# Patient Record
Sex: FEMALE | Race: WHITE | NOT HISPANIC OR LATINO
[De-identification: names, ages, dates, MRNs, and addresses within clinical notes are randomized per-mention and may not be internally consistent; named-entity substitution may affect disease eponyms.]

---

## 2018-12-22 ENCOUNTER — TRANSCRIPTION ENCOUNTER (OUTPATIENT)
Age: 33
End: 2018-12-22

## 2019-08-02 ENCOUNTER — TRANSCRIPTION ENCOUNTER (OUTPATIENT)
Age: 34
End: 2019-08-02

## 2020-08-18 ENCOUNTER — APPOINTMENT (OUTPATIENT)
Dept: OBGYN | Facility: CLINIC | Age: 35
End: 2020-08-18
Payer: COMMERCIAL

## 2020-08-18 VITALS
SYSTOLIC BLOOD PRESSURE: 105 MMHG | DIASTOLIC BLOOD PRESSURE: 68 MMHG | HEIGHT: 64 IN | BODY MASS INDEX: 19.63 KG/M2 | WEIGHT: 115 LBS

## 2020-08-18 DIAGNOSIS — Z78.9 OTHER SPECIFIED HEALTH STATUS: ICD-10-CM

## 2020-08-18 DIAGNOSIS — Z30.9 ENCOUNTER FOR CONTRACEPTIVE MANAGEMENT, UNSPECIFIED: ICD-10-CM

## 2020-08-18 PROCEDURE — 99385 PREV VISIT NEW AGE 18-39: CPT

## 2020-08-18 NOTE — PHYSICAL EXAM
[Labia Minora] : labia minora [External Hemorrhoid] : no external hemorrhoids [Normal] : clitoris [Labia Majora] : labia major [FreeTextEntry7] : no uterine or adnexal masses [FreeTextEntry4] : rugous no discharge [FreeTextEntry5] : non tender PAP done

## 2020-08-18 NOTE — CHIEF COMPLAINT
[Initial Visit] : initial GYN visit [FreeTextEntry1] : 35 yr old new patient for annual exam and sands birth cintrol

## 2020-08-24 LAB — CYTOLOGY CVX/VAG DOC THIN PREP: NORMAL

## 2020-09-02 ENCOUNTER — TRANSCRIPTION ENCOUNTER (OUTPATIENT)
Age: 35
End: 2020-09-02

## 2020-11-06 ENCOUNTER — TRANSCRIPTION ENCOUNTER (OUTPATIENT)
Age: 35
End: 2020-11-06

## 2020-12-23 PROBLEM — Z30.9 ENCOUNTER FOR BIRTH CONTROL: Status: RESOLVED | Noted: 2020-08-18 | Resolved: 2020-12-23

## 2021-03-25 ENCOUNTER — NON-APPOINTMENT (OUTPATIENT)
Age: 36
End: 2021-03-25

## 2021-03-25 ENCOUNTER — RX RENEWAL (OUTPATIENT)
Age: 36
End: 2021-03-25

## 2021-09-01 ENCOUNTER — APPOINTMENT (OUTPATIENT)
Dept: OBGYN | Facility: CLINIC | Age: 36
End: 2021-09-01
Payer: COMMERCIAL

## 2021-09-01 ENCOUNTER — NON-APPOINTMENT (OUTPATIENT)
Age: 36
End: 2021-09-01

## 2021-09-01 VITALS
BODY MASS INDEX: 20.49 KG/M2 | WEIGHT: 120 LBS | SYSTOLIC BLOOD PRESSURE: 97 MMHG | DIASTOLIC BLOOD PRESSURE: 68 MMHG | HEIGHT: 64 IN

## 2021-09-01 DIAGNOSIS — Z01.419 ENCOUNTER FOR GYNECOLOGICAL EXAMINATION (GENERAL) (ROUTINE) W/OUT ABNORMAL FINDINGS: ICD-10-CM

## 2021-09-01 PROCEDURE — 99395 PREV VISIT EST AGE 18-39: CPT

## 2021-09-01 RX ORDER — ETONOGESTREL AND ETHINYL ESTRADIOL 11.7; 2.7 MG/1; MG/1
0.12-0.015 INSERT, EXTENDED RELEASE VAGINAL
Qty: 6 | Refills: 5 | Status: ACTIVE | COMMUNITY
Start: 2021-09-01 | End: 1900-01-01

## 2021-09-01 NOTE — PHYSICAL EXAM
[Appropriately responsive] : appropriately responsive [Alert] : alert [No Acute Distress] : no acute distress [No Lymphadenopathy] : no lymphadenopathy [Regular Rate Rhythm] : regular rate rhythm [No Murmurs] : no murmurs [Clear to Auscultation B/L] : clear to auscultation bilaterally [Soft] : soft [Non-tender] : non-tender [Oriented x3] : oriented x3 [FreeTextEntry4] : S1 S2 [FreeTextEntry3] : no thyromegaly [FreeTextEntry7] : no organomegaly [Examination Of The Breasts] : a normal appearance [No Masses] : no breast masses were palpable [Labia Majora] : normal [Labia Minora] : normal [Normal] : normal [FreeTextEntry5] : non tender PAP done [FreeTextEntry6] : no uterine or adnexal masses [FreeTextEntry8] : ml bimanial

## 2021-09-01 NOTE — DISCUSSION/SUMMARY
[FreeTextEntry1] : 36 yr old has girl 2013 and boy 2017 She is in good health denies any problems complete exam done Sent Etgestranl estradiol ring with renewal to pharmacy

## 2021-09-08 LAB — CYTOLOGY CVX/VAG DOC THIN PREP: NORMAL

## 2021-09-16 ENCOUNTER — APPOINTMENT (OUTPATIENT)
Dept: OBGYN | Facility: CLINIC | Age: 36
End: 2021-09-16
Payer: COMMERCIAL

## 2021-09-16 DIAGNOSIS — N94.3 PREMENSTRUAL TENSION SYNDROME: ICD-10-CM

## 2021-09-16 PROCEDURE — 36415 COLL VENOUS BLD VENIPUNCTURE: CPT

## 2021-09-17 PROBLEM — N94.3 PREMENSTRUAL SYNDROME: Status: ACTIVE | Noted: 2021-09-17

## 2021-09-17 LAB
ESTRADIOL SERPL-MCNC: 6 PG/ML
FSH SERPL-MCNC: 12.7 IU/L
LH SERPL-ACNC: 4.5 IU/L
PROGEST SERPL-MCNC: 0.1 NG/ML
PROLACTIN SERPL-MCNC: 6.1 NG/ML
TSH SERPL-ACNC: 1.39 UIU/ML

## 2021-09-17 RX ORDER — FLUOXETINE HYDROCHLORIDE 20 MG/1
20 CAPSULE ORAL
Qty: 30 | Refills: 6 | Status: ACTIVE | COMMUNITY
Start: 2021-09-17 | End: 1900-01-01

## 2021-09-21 LAB
TESTOST BND SERPL-MCNC: 1.3 PG/ML
TESTOSTERONE TOTAL S: 3 NG/DL

## 2021-11-30 ENCOUNTER — TRANSCRIPTION ENCOUNTER (OUTPATIENT)
Age: 36
End: 2021-11-30

## 2022-04-04 ENCOUNTER — TRANSCRIPTION ENCOUNTER (OUTPATIENT)
Age: 37
End: 2022-04-04

## 2022-08-11 ENCOUNTER — RX RENEWAL (OUTPATIENT)
Age: 37
End: 2022-08-11

## 2022-08-11 RX ORDER — ETONOGESTREL AND ETHINYL ESTRADIOL 11.7; 2.7 MG/1; MG/1
0.12-0.015 INSERT, EXTENDED RELEASE VAGINAL
Qty: 4 | Refills: 0 | Status: ACTIVE | COMMUNITY
Start: 2020-08-18 | End: 1900-01-01

## 2022-09-28 ENCOUNTER — RESULT REVIEW (OUTPATIENT)
Age: 37
End: 2022-09-28

## 2023-01-16 ENCOUNTER — NON-APPOINTMENT (OUTPATIENT)
Age: 38
End: 2023-01-16

## 2023-02-09 ENCOUNTER — APPOINTMENT (OUTPATIENT)
Dept: INTERNAL MEDICINE | Facility: CLINIC | Age: 38
End: 2023-02-09
Payer: COMMERCIAL

## 2023-02-09 VITALS
HEIGHT: 64 IN | SYSTOLIC BLOOD PRESSURE: 107 MMHG | HEART RATE: 82 BPM | TEMPERATURE: 98 F | BODY MASS INDEX: 19.12 KG/M2 | DIASTOLIC BLOOD PRESSURE: 65 MMHG | WEIGHT: 112 LBS | OXYGEN SATURATION: 99 %

## 2023-02-09 DIAGNOSIS — Z00.00 ENCOUNTER FOR GENERAL ADULT MEDICAL EXAMINATION W/OUT ABNORMAL FINDINGS: ICD-10-CM

## 2023-02-09 PROCEDURE — 99385 PREV VISIT NEW AGE 18-39: CPT | Mod: 25

## 2023-02-09 PROCEDURE — 36415 COLL VENOUS BLD VENIPUNCTURE: CPT

## 2023-02-09 NOTE — HEALTH RISK ASSESSMENT
[Good] : ~his/her~  mood as  good [No] : No [With Family] : lives with family [] :  [# Of Children ___] : has [unfilled] children [PapSmearComments] : 2022 normal  [FreeTextEntry2] : sales  [Never] : Never

## 2023-02-09 NOTE — HISTORY OF PRESENT ILLNESS
[FreeTextEntry1] : annual well visit  [de-identified] : 37 year old female here for annual well visit. Patient feeling well on Prozac 2 weeks prior to period for premenstrual syndrome and NuvaRing. no other medications or medical history or family history. going to [place] next week with family. no chest pain no sob. feels well overall.

## 2023-02-10 LAB
25(OH)D3 SERPL-MCNC: 52.9 NG/ML
ALBUMIN SERPL ELPH-MCNC: 4.2 G/DL
ALP BLD-CCNC: 75 U/L
ALT SERPL-CCNC: 17 U/L
ANION GAP SERPL CALC-SCNC: 12 MMOL/L
AST SERPL-CCNC: 20 U/L
BASOPHILS # BLD AUTO: 0.04 K/UL
BASOPHILS NFR BLD AUTO: 0.9 %
BILIRUB SERPL-MCNC: 0.8 MG/DL
BUN SERPL-MCNC: 14 MG/DL
CALCIUM SERPL-MCNC: 9.8 MG/DL
CHLORIDE SERPL-SCNC: 102 MMOL/L
CHOLEST SERPL-MCNC: 313 MG/DL
CO2 SERPL-SCNC: 26 MMOL/L
CREAT SERPL-MCNC: 0.97 MG/DL
EGFR: 76 ML/MIN/1.73M2
EOSINOPHIL # BLD AUTO: 0.45 K/UL
EOSINOPHIL NFR BLD AUTO: 9.7 %
ESTIMATED AVERAGE GLUCOSE: 105 MG/DL
FOLATE SERPL-MCNC: 16.7 NG/ML
GLUCOSE SERPL-MCNC: 90 MG/DL
HBA1C MFR BLD HPLC: 5.3 %
HCT VFR BLD CALC: 42.8 %
HDLC SERPL-MCNC: 95 MG/DL
HGB BLD-MCNC: 14 G/DL
IMM GRANULOCYTES NFR BLD AUTO: 0.2 %
LDLC SERPL CALC-MCNC: 177 MG/DL
LYMPHOCYTES # BLD AUTO: 1.64 K/UL
LYMPHOCYTES NFR BLD AUTO: 35.4 %
MAGNESIUM SERPL-MCNC: 2.1 MG/DL
MAN DIFF?: NORMAL
MCHC RBC-ENTMCNC: 29.4 PG
MCHC RBC-ENTMCNC: 32.7 GM/DL
MCV RBC AUTO: 89.9 FL
MONOCYTES # BLD AUTO: 0.33 K/UL
MONOCYTES NFR BLD AUTO: 7.1 %
NEUTROPHILS # BLD AUTO: 2.16 K/UL
NEUTROPHILS NFR BLD AUTO: 46.7 %
NONHDLC SERPL-MCNC: 218 MG/DL
PLATELET # BLD AUTO: 250 K/UL
POTASSIUM SERPL-SCNC: 4.8 MMOL/L
PROT SERPL-MCNC: 7 G/DL
RBC # BLD: 4.76 M/UL
RBC # FLD: 13.6 %
SODIUM SERPL-SCNC: 140 MMOL/L
TRIGL SERPL-MCNC: 205 MG/DL
TSH SERPL-ACNC: 2.05 UIU/ML
VIT B12 SERPL-MCNC: >2000 PG/ML
WBC # FLD AUTO: 4.63 K/UL

## 2023-11-29 ENCOUNTER — APPOINTMENT (OUTPATIENT)
Dept: INTERNAL MEDICINE | Facility: CLINIC | Age: 38
End: 2023-11-29
Payer: COMMERCIAL

## 2023-11-29 VITALS
WEIGHT: 107 LBS | HEIGHT: 64 IN | BODY MASS INDEX: 18.27 KG/M2 | SYSTOLIC BLOOD PRESSURE: 118 MMHG | TEMPERATURE: 97.9 F | HEART RATE: 62 BPM | OXYGEN SATURATION: 100 % | DIASTOLIC BLOOD PRESSURE: 71 MMHG | RESPIRATION RATE: 16 BRPM

## 2023-11-29 DIAGNOSIS — R10.9 UNSPECIFIED ABDOMINAL PAIN: ICD-10-CM

## 2023-11-29 DIAGNOSIS — R39.9 UNSPECIFIED SYMPTOMS AND SIGNS INVOLVING THE GENITOURINARY SYSTEM: ICD-10-CM

## 2023-11-29 LAB
ALBUMIN SERPL ELPH-MCNC: 4.6 G/DL
ALP BLD-CCNC: 83 U/L
ALT SERPL-CCNC: 28 U/L
ANION GAP SERPL CALC-SCNC: 15 MMOL/L
AST SERPL-CCNC: 28 U/L
BILIRUB SERPL-MCNC: 1.2 MG/DL
BILIRUB UR QL STRIP: NORMAL
BUN SERPL-MCNC: 15 MG/DL
CALCIUM SERPL-MCNC: 9.6 MG/DL
CHLORIDE SERPL-SCNC: 101 MMOL/L
CLARITY UR: NORMAL
CO2 SERPL-SCNC: 24 MMOL/L
COLLECTION METHOD: NORMAL
CREAT SERPL-MCNC: 0.9 MG/DL
EGFR: 83 ML/MIN/1.73M2
GLUCOSE SERPL-MCNC: 94 MG/DL
GLUCOSE UR-MCNC: NORMAL
HCG UR QL: 0.2 EU/DL
HCT VFR BLD CALC: 44.4 %
HGB BLD-MCNC: 15 G/DL
HGB UR QL STRIP.AUTO: NORMAL
KETONES UR-MCNC: NORMAL
LEUKOCYTE ESTERASE UR QL STRIP: NORMAL
MCHC RBC-ENTMCNC: 28.6 PG
MCHC RBC-ENTMCNC: 33.8 GM/DL
MCV RBC AUTO: 84.7 FL
NITRITE UR QL STRIP: NORMAL
PH UR STRIP: 6
PLATELET # BLD AUTO: 221 K/UL
POTASSIUM SERPL-SCNC: 5.1 MMOL/L
PROT SERPL-MCNC: 7 G/DL
PROT UR STRIP-MCNC: NORMAL
RBC # BLD: 5.24 M/UL
RBC # FLD: 12.6 %
SODIUM SERPL-SCNC: 139 MMOL/L
SP GR UR STRIP: 1.02
WBC # FLD AUTO: 6.37 K/UL

## 2023-11-29 PROCEDURE — 81003 URINALYSIS AUTO W/O SCOPE: CPT | Mod: QW

## 2023-11-29 PROCEDURE — 99214 OFFICE O/P EST MOD 30 MIN: CPT | Mod: 25

## 2023-12-13 LAB — BACTERIA UR CULT: NORMAL

## 2023-12-14 ENCOUNTER — APPOINTMENT (OUTPATIENT)
Dept: INTERNAL MEDICINE | Facility: CLINIC | Age: 38
End: 2023-12-14

## 2024-12-16 ENCOUNTER — RESULT REVIEW (OUTPATIENT)
Age: 39
End: 2024-12-16

## 2024-12-16 ENCOUNTER — INPATIENT (INPATIENT)
Facility: HOSPITAL | Age: 39
LOS: 1 days | Discharge: HOME | DRG: 64 | End: 2024-12-18
Attending: PSYCHIATRY & NEUROLOGY | Admitting: HOSPITALIST
Payer: COMMERCIAL

## 2024-12-16 VITALS
OXYGEN SATURATION: 97 % | DIASTOLIC BLOOD PRESSURE: 66 MMHG | HEART RATE: 80 BPM | RESPIRATION RATE: 18 BRPM | SYSTOLIC BLOOD PRESSURE: 122 MMHG | TEMPERATURE: 98 F

## 2024-12-16 DIAGNOSIS — R11.2 NAUSEA WITH VOMITING, UNSPECIFIED: ICD-10-CM

## 2024-12-16 DIAGNOSIS — R17 UNSPECIFIED JAUNDICE: ICD-10-CM

## 2024-12-16 DIAGNOSIS — Z90.89 ACQUIRED ABSENCE OF OTHER ORGANS: Chronic | ICD-10-CM

## 2024-12-16 DIAGNOSIS — J98.2 INTERSTITIAL EMPHYSEMA: ICD-10-CM

## 2024-12-16 DIAGNOSIS — I63.9 CEREBRAL INFARCTION, UNSPECIFIED: ICD-10-CM

## 2024-12-16 DIAGNOSIS — Z29.9 ENCOUNTER FOR PROPHYLACTIC MEASURES, UNSPECIFIED: ICD-10-CM

## 2024-12-16 DIAGNOSIS — F32.81 PREMENSTRUAL DYSPHORIC DISORDER: ICD-10-CM

## 2024-12-16 LAB
A1C WITH ESTIMATED AVERAGE GLUCOSE RESULT: 5.5 % — SIGNIFICANT CHANGE UP (ref 4–5.6)
ALBUMIN SERPL ELPH-MCNC: 4 G/DL — SIGNIFICANT CHANGE UP (ref 3.3–5)
ALP SERPL-CCNC: 63 U/L — SIGNIFICANT CHANGE UP (ref 40–120)
ALT FLD-CCNC: 15 U/L — SIGNIFICANT CHANGE UP (ref 10–45)
ANION GAP SERPL CALC-SCNC: 15 MMOL/L — SIGNIFICANT CHANGE UP (ref 5–17)
APTT BLD: 25 SEC — SIGNIFICANT CHANGE UP (ref 24.5–35.6)
AST SERPL-CCNC: 16 U/L — SIGNIFICANT CHANGE UP (ref 10–40)
BASOPHILS # BLD AUTO: 0.02 K/UL — SIGNIFICANT CHANGE UP (ref 0–0.2)
BASOPHILS NFR BLD AUTO: 0.4 % — SIGNIFICANT CHANGE UP (ref 0–2)
BILIRUB SERPL-MCNC: 1.1 MG/DL — SIGNIFICANT CHANGE UP (ref 0.2–1.2)
BLD GP AB SCN SERPL QL: NEGATIVE — SIGNIFICANT CHANGE UP
BUN SERPL-MCNC: 12 MG/DL — SIGNIFICANT CHANGE UP (ref 7–23)
CALCIUM SERPL-MCNC: 9 MG/DL — SIGNIFICANT CHANGE UP (ref 8.4–10.5)
CHLORIDE SERPL-SCNC: 102 MMOL/L — SIGNIFICANT CHANGE UP (ref 96–108)
CHOLEST SERPL-MCNC: 288 MG/DL — HIGH
CO2 SERPL-SCNC: 22 MMOL/L — SIGNIFICANT CHANGE UP (ref 22–31)
CREAT SERPL-MCNC: 0.91 MG/DL — SIGNIFICANT CHANGE UP (ref 0.5–1.3)
EGFR: 81 ML/MIN/1.73M2 — SIGNIFICANT CHANGE UP
EOSINOPHIL # BLD AUTO: 0.09 K/UL — SIGNIFICANT CHANGE UP (ref 0–0.5)
EOSINOPHIL NFR BLD AUTO: 1.8 % — SIGNIFICANT CHANGE UP (ref 0–6)
ESTIMATED AVERAGE GLUCOSE: 111 MG/DL — SIGNIFICANT CHANGE UP (ref 68–114)
GLUCOSE SERPL-MCNC: 98 MG/DL — SIGNIFICANT CHANGE UP (ref 70–99)
HCT VFR BLD CALC: 41.8 % — SIGNIFICANT CHANGE UP (ref 34.5–45)
HDLC SERPL-MCNC: 95 MG/DL — SIGNIFICANT CHANGE UP
HGB BLD-MCNC: 14.1 G/DL — SIGNIFICANT CHANGE UP (ref 11.5–15.5)
IMM GRANULOCYTES NFR BLD AUTO: 0.2 % — SIGNIFICANT CHANGE UP (ref 0–0.9)
INR BLD: 0.98 RATIO — SIGNIFICANT CHANGE UP (ref 0.85–1.16)
LIPID PNL WITH DIRECT LDL SERPL: 171 MG/DL — HIGH
LYMPHOCYTES # BLD AUTO: 1.22 K/UL — SIGNIFICANT CHANGE UP (ref 1–3.3)
LYMPHOCYTES # BLD AUTO: 23.8 % — SIGNIFICANT CHANGE UP (ref 13–44)
MAGNESIUM SERPL-MCNC: 2.3 MG/DL — SIGNIFICANT CHANGE UP (ref 1.6–2.6)
MCHC RBC-ENTMCNC: 28.5 PG — SIGNIFICANT CHANGE UP (ref 27–34)
MCHC RBC-ENTMCNC: 33.7 G/DL — SIGNIFICANT CHANGE UP (ref 32–36)
MCV RBC AUTO: 84.4 FL — SIGNIFICANT CHANGE UP (ref 80–100)
MONOCYTES # BLD AUTO: 0.65 K/UL — SIGNIFICANT CHANGE UP (ref 0–0.9)
MONOCYTES NFR BLD AUTO: 12.7 % — SIGNIFICANT CHANGE UP (ref 2–14)
NEUTROPHILS # BLD AUTO: 3.13 K/UL — SIGNIFICANT CHANGE UP (ref 1.8–7.4)
NEUTROPHILS NFR BLD AUTO: 61.1 % — SIGNIFICANT CHANGE UP (ref 43–77)
NON HDL CHOLESTEROL: 193 MG/DL — HIGH
NRBC # BLD: 0 /100 WBCS — SIGNIFICANT CHANGE UP (ref 0–0)
PHOSPHATE SERPL-MCNC: 3.5 MG/DL — SIGNIFICANT CHANGE UP (ref 2.5–4.5)
PLATELET # BLD AUTO: 196 K/UL — SIGNIFICANT CHANGE UP (ref 150–400)
POTASSIUM SERPL-MCNC: 3.8 MMOL/L — SIGNIFICANT CHANGE UP (ref 3.5–5.3)
POTASSIUM SERPL-SCNC: 3.8 MMOL/L — SIGNIFICANT CHANGE UP (ref 3.5–5.3)
PROT SERPL-MCNC: 6.9 G/DL — SIGNIFICANT CHANGE UP (ref 6–8.3)
PROTHROM AB SERPL-ACNC: 11.3 SEC — SIGNIFICANT CHANGE UP (ref 9.9–13.4)
RBC # BLD: 4.95 M/UL — SIGNIFICANT CHANGE UP (ref 3.8–5.2)
RBC # FLD: 12.8 % — SIGNIFICANT CHANGE UP (ref 10.3–14.5)
RH IG SCN BLD-IMP: POSITIVE — SIGNIFICANT CHANGE UP
SODIUM SERPL-SCNC: 139 MMOL/L — SIGNIFICANT CHANGE UP (ref 135–145)
TRIGL SERPL-MCNC: 128 MG/DL — SIGNIFICANT CHANGE UP
WBC # BLD: 5.12 K/UL — SIGNIFICANT CHANGE UP (ref 3.8–10.5)
WBC # FLD AUTO: 5.12 K/UL — SIGNIFICANT CHANGE UP (ref 3.8–10.5)

## 2024-12-16 PROCEDURE — 93010 ELECTROCARDIOGRAM REPORT: CPT

## 2024-12-16 PROCEDURE — 70547 MR ANGIOGRAPHY NECK W/O DYE: CPT | Mod: 26

## 2024-12-16 PROCEDURE — 93356 MYOCRD STRAIN IMG SPCKL TRCK: CPT

## 2024-12-16 PROCEDURE — 70544 MR ANGIOGRAPHY HEAD W/O DYE: CPT | Mod: 26

## 2024-12-16 PROCEDURE — 93306 TTE W/DOPPLER COMPLETE: CPT | Mod: 26

## 2024-12-16 PROCEDURE — 70551 MRI BRAIN STEM W/O DYE: CPT | Mod: 26

## 2024-12-16 PROCEDURE — 74177 CT ABD & PELVIS W/CONTRAST: CPT | Mod: 26

## 2024-12-16 PROCEDURE — 71260 CT THORAX DX C+: CPT | Mod: 26

## 2024-12-16 PROCEDURE — 99223 1ST HOSP IP/OBS HIGH 75: CPT | Mod: GC

## 2024-12-16 PROCEDURE — 99255 IP/OBS CONSLTJ NEW/EST HI 80: CPT

## 2024-12-16 PROCEDURE — 71045 X-RAY EXAM CHEST 1 VIEW: CPT | Mod: 26

## 2024-12-16 RX ORDER — ENOXAPARIN SODIUM 30 MG/.3ML
40 INJECTION SUBCUTANEOUS EVERY 24 HOURS
Refills: 0 | Status: DISCONTINUED | OUTPATIENT
Start: 2024-12-16 | End: 2024-12-16

## 2024-12-16 RX ORDER — ENOXAPARIN SODIUM 30 MG/.3ML
40 INJECTION SUBCUTANEOUS EVERY 24 HOURS
Refills: 0 | Status: DISCONTINUED | OUTPATIENT
Start: 2024-12-17 | End: 2024-12-18

## 2024-12-16 RX ORDER — PANTOPRAZOLE SODIUM 40 MG/1
40 TABLET, DELAYED RELEASE ORAL
Refills: 0 | Status: DISCONTINUED | OUTPATIENT
Start: 2024-12-16 | End: 2024-12-18

## 2024-12-16 RX ORDER — CLOPIDOGREL 75 MG/1
75 TABLET, FILM COATED ORAL DAILY
Refills: 0 | Status: DISCONTINUED | OUTPATIENT
Start: 2024-12-16 | End: 2024-12-16

## 2024-12-16 RX ORDER — PANTOPRAZOLE SODIUM 40 MG/1
1 TABLET, DELAYED RELEASE ORAL
Refills: 0 | DISCHARGE

## 2024-12-16 RX ORDER — ACETAMINOPHEN 500MG 500 MG/1
650 TABLET, COATED ORAL EVERY 6 HOURS
Refills: 0 | Status: DISCONTINUED | OUTPATIENT
Start: 2024-12-16 | End: 2024-12-18

## 2024-12-16 RX ADMIN — ENOXAPARIN SODIUM 40 MILLIGRAM(S): 30 INJECTION SUBCUTANEOUS at 11:26

## 2024-12-16 RX ADMIN — ACETAMINOPHEN 500MG 650 MILLIGRAM(S): 500 TABLET, COATED ORAL at 10:02

## 2024-12-16 RX ADMIN — Medication 81 MILLIGRAM(S): at 11:26

## 2024-12-16 RX ADMIN — Medication 80 MILLIGRAM(S): at 21:07

## 2024-12-16 RX ADMIN — ACETAMINOPHEN 500MG 650 MILLIGRAM(S): 500 TABLET, COATED ORAL at 11:02

## 2024-12-16 RX ADMIN — PANTOPRAZOLE SODIUM 40 MILLIGRAM(S): 40 TABLET, DELAYED RELEASE ORAL at 05:05

## 2024-12-16 NOTE — H&P ADULT - NSHPPHYSICALEXAM_GEN_ALL_CORE
T(C): 36.9 (12-16-24 @ 01:58), Max: 36.9 (12-16-24 @ 01:58)  HR: 80 (12-16-24 @ 01:58) (80 - 80)  BP: 122/66 (12-16-24 @ 01:58) (122/66 - 122/66)  RR: 18 (12-16-24 @ 01:58) (18 - 18)  SpO2: 97% (12-16-24 @ 01:58) (97% - 97%)    GENERAL: Well-appearing, well-nourished, NAD  EYES: EOMI, no scleral icterus  NECK: No JVD, no carotid bruits  LUNG: CTAB, no wheezes, no rhonchi, no accessory muscle use  HEART: RRR, no m/g/r  ABDOMEN: Soft, NT, ND  EXTREMITIES:  No BLE edema  PSYCH: normal affect, normal mood  SKIN: No rashes or lesions    Detailed neuro exam:  CN: PERRL, EOMI, no facial droop, sensation intact in V1-V3 territories, uvula midline  Motor: b/l UE 5/5; RLE 4/5 hip flexion, LLE 5/5 hip flexion. No pronator drift  Sensory: R sided decreased sensation  Mental status: A&Ox3, able to follow 2 step commands that cross midline, no dysarthria

## 2024-12-16 NOTE — PHYSICAL THERAPY INITIAL EVALUATION ADULT - GENERAL OBSERVATIONS, REHAB EVAL
pt received in bed nad reports feeling warm , pt vss ,  at b/s ; PT/OT present for eval ; in OT eval pt able to track finger but jumping R and L losing focus , R peripheral vision affected unable to track R side peripheral until finger right in front ;; p[t r ue and R le feel numbness and tingling but able to feel light touch

## 2024-12-16 NOTE — PHYSICAL THERAPY INITIAL EVALUATION ADULT - NSPTDISCHREC_GEN_A_CORE
for continued increase in fxl mobility to restore to previous level of functional independence , improve strength R side , endurance , balance , fall prevention education continued ; pt and pt spouse aware pt need assist all fxl activity and adl's  at this time ,  both agreeable to plan/Outpatient PT

## 2024-12-16 NOTE — PHYSICAL THERAPY INITIAL EVALUATION ADULT - BALANCE DISTURBANCE, IDENTIFIED IMPAIRMENT CONTRIBUTE, REHAB EVAL
decrease endurance/impaired motor control/impaired postural control/decreased sensation/impaired sensory feedback/decreased strength

## 2024-12-16 NOTE — PHYSICAL THERAPY INITIAL EVALUATION ADULT - PLANNED THERAPY INTERVENTIONS, PT EVAL
GOAL stairs : pt will negotiate a flight of steps with HR independent in 2  weeks/balance training/bed mobility training/gait training/strengthening/transfer training

## 2024-12-16 NOTE — OCCUPATIONAL THERAPY INITIAL EVALUATION ADULT - NSOTDISCHREC_GEN_A_CORE
Home with family assist for ADL/IADL and outpatient neuro OT to address vision, strength, coordination, and balance

## 2024-12-16 NOTE — H&P ADULT - TIME BILLING
Chart review , case discussion with  other provider , obtain history,  examination of patient , answering questions and concerns , review / orders labs and medications , and documentation

## 2024-12-16 NOTE — PHYSICAL THERAPY INITIAL EVALUATION ADULT - PERTINENT HX OF CURRENT PROBLEM, REHAB EVAL
39 year old right handed female with PMHx PMDD presenting as transfer from [facility] due to acute left occipital lobe infarct. LKW on 12/13/2024 at around 23:00. On 12/14/2024 upon waking up at around 6 AM, patient noticed right upper and lower extremity numbness and weakness. Patient presented to [facility], where code stroke was initiated. NIHSS was 2 for visual field defect in the temporal aspect of the R eye and mild RUE drift. CTH w/o contrast at [facility] revealed acute left occipital lobe infarct with mild associated edema and no ICH. CT perfusion revealed moderate perfusion defect in the left posterior cerebral artery. 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 Tenecteplase was not administered as patient presented outside of window. Mechanical thrombectomy was not performed due to low NIHSS and distal occlusion. Patient was transferred to [facility] for further evaluation. Patient currently states that her right upper and lower extremity have mild tingling sensation and feel weaker compared to her left side. In addition, patient is unable to see in the periphery from her right eye and reports a posterior dull persistent headache. Reports having used Nuvaring for >5 years but was discontinued by OBGYN at [facility], denies history of miscarriages, has 2 children, and denies family history of hypercoagulable disorders. Ambulates independently at baseline, handles ADLs and iADLs independently.  pt passed dysphagia screen 39 year old right handed female with PMHx PMDD presenting as transfer from [facility] due to acute left occipital lobe infarct. LKW on 12/13/2024 at around 23:00. On 12/14/2024 upon waking up at around 6 AM, patient noticed right upper and lower extremity numbness and weakness. Patient presented to [facility], where code stroke was initiated. NIHSS was 2 for visual field defect in the temporal aspect of the R eye and mild RUE drift. CTH w/o contrast at [facility] revealed acute left occipital lobe infarct with mild associated edema and no ICH. CT perfusion revealed moderate perfusion defect in the left posterior cerebral artery. 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 Tenecteplase was not administered as patient presented outside of window. Mechanical thrombectomy was not performed due to low NIHSS and distal occlusion. Patient was transferred to [facility] for further evaluation. Patient currently states that her right upper and lower extremity have mild tingling sensation and feel weaker compared to her left side. In addition, patient is unable to see in the periphery from her right eye and reports a posterior dull persistent headache. Reports having used Nuvaring for >5 years but was discontinued by OBGYN at [facility], denies history of miscarriages, has 2 children, and denies family history of hypercoagulable disorders. Ambulates independently at baseline, handles ADLs and iADLs independently.  pt passed dysphagia screen  ADDENDUM 12/17/24 :   MR HEAD 12/16/24 :Evolving acute/subacute left-sided PCA distribution infarction with associated cytotoxic edema. Small rounded focus of petechial hemorrhagic transformation in the left thalamic infarction bed.    MRA Head and NECK : 12/16/24 : 1.  RIGHT CAROTID NECK CIRCULATION:   Intact.   2.  LEFT CAROTID   NECK CIRCULATION:    Intact.    3.  VERTEBRAL NECK CIRCULATION:   Patent dominant caliber left vertebral artery. Occluded right vertebral artery. T1 hyperintensity is consistent with arterial dissection     4.  ANTERIOR INTRACRANIAL CIRCULATION:     Intact.   5.  POSTERIOR INTRACRANIAL CIRCULATION:   Occluded and flow right vertebral artery. Reversal of flow from the basilar artery to its patent PICA. Left posterior cerebral artery occludes at its distal P1 segment.   Vertebral and right posterior cerebral arteries are intact. 39 year old right handed female with PMHx PMDD presenting as transfer from [facility] due to acute left occipital lobe infarct. LKW on 12/13/2024 at around 23:00. On 12/14/2024 upon waking up at around 6 AM, patient noticed right upper and lower extremity numbness and weakness. Patient presented to [facility], where code stroke was initiated. NIHSS was 2 for visual field defect in the temporal aspect of the R eye and mild RUE drift. CTH w/o contrast at [facility] revealed acute left occipital lobe infarct with mild associated edema and no ICH. CT perfusion revealed moderate perfusion defect in the left posterior cerebral artery. 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 Tenecteplase was not administered as patient presented outside of window. Mechanical thrombectomy was not performed due to low NIHSS and distal occlusion. Patient was transferred to [facility] for further evaluation. Patient currently states that her right upper and lower extremity have mild tingling sensation and feel weaker compared to her left side. In addition, patient is unable to see in the periphery from her right eye and reports a posterior dull persistent headache.  at [facility]. On CT scans, patient found to have pneumomediastinum. Thoracic surgery consulted here at [facility]   12/17 Esophagram completed> await read  Reports having used Nuvaring for >5 years but was discontinued by OBGYN at [facility], denies history of miscarriages, has 2 children, and denies family history of hypercoagulable disorders. Ambulates independently at baseline, handles ADLs and iADLs independently.  pt passed dysphagia screen  ADDENDUM 12/17/24 :   MR HEAD 12/16/24 :Evolving acute/subacute left-sided PCA distribution infarction with associated cytotoxic edema. Small rounded focus of petechial hemorrhagic transformation in the left thalamic infarction bed.    MRA Head and NECK : 12/16/24 : 1.  RIGHT CAROTID NECK CIRCULATION:   Intact.   2.  LEFT CAROTID   NECK CIRCULATION:    Intact.    3.  VERTEBRAL NECK CIRCULATION:   Patent dominant caliber left vertebral artery. Occluded right vertebral artery. T1 hyperintensity is consistent with arterial dissection     4.  ANTERIOR INTRACRANIAL CIRCULATION:     Intact.   5.  POSTERIOR INTRACRANIAL CIRCULATION:   Occluded and flow right vertebral artery. Reversal of flow from the basilar artery to its patent PICA. Left posterior cerebral artery occludes at its distal P1 segment.   Vertebral and right posterior cerebral arteries are intact.

## 2024-12-16 NOTE — CONSULT NOTE ADULT - ASSESSMENT
RECOMMENDATIONS:    [] Stroke in the young workup:      -CT Chest/Abdomen/Pelvis     -LOREN     -PT/PTT/INR, Fibrinogen, Anticardiolipin Ab, Beta-2-GP-1 Ab, Lupus AC, Antithrombin III, Protein C/S, Factor V, Factor VII - IX, Factor XI, Homocysteine, Von Willebrand Factor, Plasminogen panel, DRVVT screen &   ratio, Silica Clotting time,  Prothrombin gene [id number] mutation, MTHFR mutation  [] MRI brain without contrast  [] Will discuss ILR  [] HbA1C and Lipid Panel  [] Atorvastatin 80MG QHS, titrate to LDL<70  [] DVT prophylaxis - Lovenox 40MG SC daily  [] Telemonitoring; Neurochecks and vital signs Q4H  [] Gradual normotension (BP <130/90)  [] BG goal <180, avoid hypoglycemia  [] NPO until clears dysphagia screen, otherwise swallow evaluation  [] Fall, aspiration precautions  [] PT/OT eval  [] Stroke education provided     ASSESSMENT: 39 year old right handed female with PMHx PMDD presenting as transfer from [facility] due to acute left occipital lobe infarct. LKW on 12/13/2024 at around 23:00. On 12/14/2024 upon waking up at around 6 AM, patient noticed right upper and lower extremity numbness and weakness. Patient presented to [facility], where code stroke was initiated. NIHSS was 2 for visual field defect in the temporal aspect of the R eye and mild RUE drift. CTH w/o contrast at [facility] revealed acute left occipital lobe infarct, CT perfusion revealed moderate perfusion defect in the left posterior cerebral artery and CTA H/N revealed occlusion of the V2 segment of the right vertebral artery, occlusion of the P2 segment of the left posterior cerebral artery with diminished vascularity in the left inferior PCA territory. Tenecteplase was not administered as patient presented outside of window. Mechanical thrombectomy was not performed due to low NIHSS and distal occlusion. NIHSS upon presentation to [facility] was 2. Neurological exam on 12/16/2024 revealed RUE and RLE strength 4+/5, sensation intact to light touch in all 4 extremities, and R homonymous hemianopsia.       IMPRESSION: R sensorimotor symptoms and right homonymous hemianopsia, localizing to left brain dysfunction. Imaging revealed acute left occipital lobe infarct, occlusion of the V2 segment of the right vertebral artery, occlusion of the P2 segment of the left posterior cerebral artery with diminished vascularity in the left inferior PCA territory. Etiology likely due to hypercoagulability, possibly due to long-term OCP use vs inherited causes. Mechanism unknown, pending workup.     RECOMMENDATIONS:  [] Stroke in the young workup:      -CT Chest/Abdomen/Pelvis     -LOREN     -PT/PTT/INR, Fibrinogen, Anticardiolipin Ab, Beta-2-GP-1 Ab, Lupus AC, Antithrombin III, Protein C/S, Factor V, Factor VII - IX, Factor XI, Homocysteine, Von Willebrand Factor, Plasminogen panel, DRVVT screen &   ratio, Silica Clotting time,  Prothrombin gene [id number] mutation, MTHFR mutation  [] MRI brain without contrast  [] Will discuss ILR  [] HbA1C and Lipid Panel  [] Continue Aspirin 81 mg qd  [] Atorvastatin 80MG QHS, titrate to LDL<70  [] Telemonitoring; Neurochecks and vital signs Q4H  [] Gradual normotension (BP <130/90)  [] BG goal <180, avoid hypoglycemia  [] NPO until clears dysphagia screen, otherwise swallow evaluation  [] Fall, aspiration precautions  [] PT/OT eval  [] Stroke education provided  [] DVT prophylaxis - Lovenox 40MG SC daily      Note and recommendations considered incomplete until attending attestation.   ASSESSMENT: 39 year old right handed female with PMHx PMDD presenting as transfer from [facility] due to acute left occipital lobe infarct. LKW on 12/13/2024 at around 23:00. On 12/14/2024 upon waking up at around 6 AM, patient noticed right upper and lower extremity numbness and weakness. Patient presented to [facility], where code stroke was initiated. NIHSS was 2 for visual field defect in the temporal aspect of the R eye and mild RUE drift. CTH w/o contrast at [facility] revealed acute left occipital lobe infarct, CT perfusion revealed moderate perfusion defect in the left posterior cerebral artery and CTA H/N revealed occlusion of the V2 segment of the right vertebral artery, occlusion of the P2 segment of the left posterior cerebral artery with diminished vascularity in the left inferior PCA territory. Tenecteplase was not administered as patient presented outside of window. Mechanical thrombectomy was not performed due to low NIHSS and distal occlusion. NIHSS upon presentation to [facility] was 2. Neurological exam on 12/16/2024 revealed RUE and RLE strength 4+/5, sensation intact to light touch in all 4 extremities, and R homonymous hemianopsia.       IMPRESSION: R sensorimotor symptoms and right homonymous hemianopsia, localizing to left brain dysfunction. Imaging revealed acute left occipital lobe infarct, occlusion of the V2 segment of the right vertebral artery, occlusion of the P2 segment of the left posterior cerebral artery with diminished vascularity in the left inferior PCA territory. Etiology likely due to hypercoagulability, possibly due to long-term OCP use vs inherited causes. Mechanism unknown, pending workup.     RECOMMENDATIONS:  [] Stroke in the young workup:      -CT Chest/Abdomen/Pelvis     -LOREN     -PT/PTT/INR, Fibrinogen, Anticardiolipin Ab, Beta-2-GP-1 Ab, Lupus AC, Antithrombin III, Protein C/S, Factor V, Factor VII - IX, Factor XI, Homocysteine, Von Willebrand Factor, Plasminogen panel, DRVVT screen &   ratio, Silica Clotting time,  Prothrombin gene [id number] mutation, MTHFR mutation  [] MRI brain without contrast  [] Will discuss ILR  [] HbA1C and Lipid Panel  [] Continue Aspirin 81 mg qd  [] Atorvastatin 80MG QHS, titrate to LDL<70  [] Telemonitoring; Neurochecks and vital signs Q2H  [] Gradual normotension (BP <130/90)  [] BG goal <180, avoid hypoglycemia  [] NPO until clears dysphagia screen, otherwise swallow evaluation  [] Fall, aspiration precautions  [] PT/OT eval  [] Stroke education provided  [] DVT prophylaxis - Lovenox 40MG SC daily      Note and recommendations considered incomplete until attending attestation.   ASSESSMENT: 39 year old right handed female with PMHx PMDD presenting as transfer from [facility] due to acute left occipital lobe infarct. LKW on 12/13/2024 at around 23:00. On 12/14/2024 upon waking up at around 6 AM, patient noticed right upper and lower extremity numbness and weakness. Patient presented to [facility], where code stroke was initiated. NIHSS was 2 for visual field defect in the temporal aspect of the R eye and mild RUE drift. CTH w/o contrast at [facility] revealed acute left occipital lobe infarct, CT perfusion revealed moderate perfusion defect in the left posterior cerebral artery and CTA H/N revealed occlusion of the V2 segment of the right vertebral artery, occlusion of the P2 segment of the left posterior cerebral artery with diminished vascularity in the left inferior PCA territory. Tenecteplase was not administered as patient presented outside of window. Mechanical thrombectomy was not performed due to low NIHSS and distal occlusion. NIHSS upon presentation to [facility] was 2. Neurological exam on 12/16/2024 revealed RUE and RLE strength 4+/5, sensation intact to light touch in all 4 extremities, and R homonymous hemianopsia.       IMPRESSION: R sensorimotor symptoms and right homonymous hemianopsia, localizing to left brain dysfunction. Imaging revealed acute left occipital lobe infarct, occlusion of the V2 segment of the right vertebral artery, occlusion of the P2 segment of the left posterior cerebral artery with diminished vascularity in the left inferior PCA territory. Etiology likely due to hypercoagulability, possibly due to long-term OCP use vs inherited causes. Mechanism unknown, pending workup.     RECOMMENDATIONS:  [] Stroke in the young workup:      -CT Chest/Abdomen/Pelvis     -LOREN     -PT/PTT/INR, Fibrinogen, Anticardiolipin Ab, Beta-2-GP-1 Ab, Lupus AC, Antithrombin III, Protein C/S, Factor V, Factor VII - IX, Factor XI, Homocysteine, Von Willebrand Factor, Plasminogen panel, DRVVT screen &   ratio, Silica Clotting time,  Prothrombin gene [id number] mutation, MTHFR mutation  [] MRI brain without contrast  [] Will discuss ILR  [] HbA1C and Lipid Panel  [] Continue Aspirin 81 mg qd  [] Atorvastatin 80MG QHS, titrate to LDL<70  [] Telemonitoring; NIHSS stroke Neurochecks and vital signs Q4H  [] Gradual normotension (BP <130/90)  [] BG goal <180, avoid hypoglycemia  [] NPO until clears dysphagia screen, otherwise swallow evaluation  [] Fall, aspiration precautions  [] PT/OT eval  [] Stroke education provided  [] DVT prophylaxis - Lovenox 40MG SC daily      Note and recommendations considered incomplete until attending attestation.

## 2024-12-16 NOTE — PHYSICAL THERAPY INITIAL EVALUATION ADULT - ACTIVE RANGE OF MOTION EXAMINATION, REHAB EVAL
AROm wfl's/bilateral upper extremity Active ROM was WFL (within functional limits)/bilateral  lower extremity Active ROM was WFL (within functional limits)

## 2024-12-16 NOTE — PHYSICAL THERAPY INITIAL EVALUATION ADULT - LEVEL OF INDEPENDENCE: SUPINE/SIT, REHAB EVAL
vc to wait with change of position , pt sat several min  good balance able to don socks and sneakers and tie sneakers Bilaterally independent/supervision

## 2024-12-16 NOTE — PROGRESS NOTE ADULT - SUBJECTIVE AND OBJECTIVE BOX
PROGRESS NOTE:     Patient is a 39y old  Female who presents with a chief complaint of stroke (16 Dec 2024 04:21)      INTERVAL HISTORY: NAEO. VSS. ROS negative.    MEDICATIONS  (STANDING):  aspirin  chewable 81 milliGRAM(s) Oral daily  atorvastatin 80 milliGRAM(s) Oral at bedtime  enoxaparin Injectable 40 milliGRAM(s) SubCutaneous every 24 hours  pantoprazole    Tablet 40 milliGRAM(s) Oral before breakfast    MEDICATIONS  (PRN):  acetaminophen     Tablet .. 650 milliGRAM(s) Oral every 6 hours PRN Temp greater or equal to 38C (100.4F), Mild Pain (1 - 3)  FLUoxetine 20 milliGRAM(s) Oral daily PRN for premenstrual dysphoria disorder      CAPILLARY BLOOD GLUCOSE        I&O's Summary    16 Dec 2024 07:01  -  16 Dec 2024 15:48  --------------------------------------------------------  IN: 670 mL / OUT: 0 mL / NET: 670 mL        PHYSICAL EXAM:  Vital Signs Last 24 Hrs  T(C): 36.7 (16 Dec 2024 12:46), Max: 36.9 (16 Dec 2024 01:58)  T(F): 98 (16 Dec 2024 12:46), Max: 98.5 (16 Dec 2024 01:58)  HR: 89 (16 Dec 2024 12:46) (65 - 145)  BP: 114/76 (16 Dec 2024 12:46) (114/76 - 123/72)  BP(mean): --  RR: 18 (16 Dec 2024 12:46) (18 - 18)  SpO2: 96% (16 Dec 2024 12:46) (90% - 97%)    Parameters below as of 16 Dec 2024 12:46  Patient On (Oxygen Delivery Method): room air        CONSTITUTIONAL: NAD, well-developed  HEENT:   RESPIRATORY: Normal respiratory effort; lungs are clear to auscultation bilaterally  CARDIOVASCULAR: Regular rate and rhythm, normal S1 and S2, no murmur/rub/gallop; No lower extremity edema; Peripheral pulses are 2+ bilaterally  ABDOMEN: Nontender to palpation, normoactive bowel sounds, no rebound/guarding; No hepatosplenomegaly  MUSCULOSKELETAL: no clubbing or cyanosis of digits; no joint swelling or tenderness to palpation  PSYCH: A+O to person, place, and time; affect appropriate    LABS:                        14.1   5.12  )-----------( 196      ( 16 Dec 2024 06:12 )             41.8     12-16    139  |  102  |  12  ----------------------------<  98  3.8   |  22  |  0.91    Ca    9.0      16 Dec 2024 06:14  Phos  3.5     12-16  Mg     2.3     12-16    TPro  6.9  /  Alb  4.0  /  TBili  1.1  /  DBili  x   /  AST  16  /  ALT  15  /  AlkPhos  63  12-16    PT/INR - ( 16 Dec 2024 06:13 )   PT: 11.3 sec;   INR: 0.98 ratio         PTT - ( 16 Dec 2024 06:13 )  PTT:25.0 sec      Urinalysis Basic - ( 16 Dec 2024 06:14 )    Color: x / Appearance: x / SG: x / pH: x  Gluc: 98 mg/dL / Ketone: x  / Bili: x / Urobili: x   Blood: x / Protein: x / Nitrite: x   Leuk Esterase: x / RBC: x / WBC x   Sq Epi: x / Non Sq Epi: x / Bacteria: x          RADIOLOGY:        ******************************  Authored By: Abner Curtis MD PGY1  Internal Medicine  MS Teams  ******************************   PROGRESS NOTE:     Patient is a 39y old  Female who presents with a chief complaint of stroke (16 Dec 2024 04:21)      INTERVAL HISTORY: NAEO. VSS. ROS negative.    MEDICATIONS  (STANDING):  aspirin  chewable 81 milliGRAM(s) Oral daily  atorvastatin 80 milliGRAM(s) Oral at bedtime  enoxaparin Injectable 40 milliGRAM(s) SubCutaneous every 24 hours  pantoprazole    Tablet 40 milliGRAM(s) Oral before breakfast    MEDICATIONS  (PRN):  acetaminophen     Tablet .. 650 milliGRAM(s) Oral every 6 hours PRN Temp greater or equal to 38C (100.4F), Mild Pain (1 - 3)  FLUoxetine 20 milliGRAM(s) Oral daily PRN for premenstrual dysphoria disorder      CAPILLARY BLOOD GLUCOSE        I&O's Summary    16 Dec 2024 07:01  -  16 Dec 2024 15:48  --------------------------------------------------------  IN: 670 mL / OUT: 0 mL / NET: 670 mL        PHYSICAL EXAM:  Vital Signs Last 24 Hrs  T(C): 36.7 (16 Dec 2024 12:46), Max: 36.9 (16 Dec 2024 01:58)  T(F): 98 (16 Dec 2024 12:46), Max: 98.5 (16 Dec 2024 01:58)  HR: 89 (16 Dec 2024 12:46) (65 - 145)  BP: 114/76 (16 Dec 2024 12:46) (114/76 - 123/72)  BP(mean): --  RR: 18 (16 Dec 2024 12:46) (18 - 18)  SpO2: 96% (16 Dec 2024 12:46) (90% - 97%)    Parameters below as of 16 Dec 2024 12:46  Patient On (Oxygen Delivery Method): room air        CONSTITUTIONAL: NAD, well-developed  HEENT: MMM  RESPIRATORY: Normal respiratory effort; lungs are clear to auscultation bilaterally  CARDIOVASCULAR: Regular rate and rhythm, normal S1 and S2, no murmur/rub/gallop; No lower extremity edema; Peripheral pulses are 2+ bilaterally  ABDOMEN: Nontender to palpation, normoactive bowel sounds, no rebound/guarding; No hepatosplenomegaly  MUSCULOSKELETAL: no clubbing or cyanosis of digits; no joint swelling or tenderness to palpation  NEURO: nonfocal CN II-XII, subtle R pronator drift, fluent speech  PSYCH: A+O to person, place, and time; affect appropriate    LABS:                        14.1   5.12  )-----------( 196      ( 16 Dec 2024 06:12 )             41.8     12-16    139  |  102  |  12  ----------------------------<  98  3.8   |  22  |  0.91    Ca    9.0      16 Dec 2024 06:14  Phos  3.5     12-16  Mg     2.3     12-16    TPro  6.9  /  Alb  4.0  /  TBili  1.1  /  DBili  x   /  AST  16  /  ALT  15  /  AlkPhos  63  12-16    PT/INR - ( 16 Dec 2024 06:13 )   PT: 11.3 sec;   INR: 0.98 ratio         PTT - ( 16 Dec 2024 06:13 )  PTT:25.0 sec      Urinalysis Basic - ( 16 Dec 2024 06:14 )    Color: x / Appearance: x / SG: x / pH: x  Gluc: 98 mg/dL / Ketone: x  / Bili: x / Urobili: x   Blood: x / Protein: x / Nitrite: x   Leuk Esterase: x / RBC: x / WBC x   Sq Epi: x / Non Sq Epi: x / Bacteria: x          RADIOLOGY:        ******************************  Authored By: Abner Curtis MD PGY1  Internal Medicine  MS Teams  ******************************

## 2024-12-16 NOTE — PROVIDER CONTACT NOTE (OTHER) - ASSESSMENT
Pt ANO4, s/p acute ischemic stroke, VSS. Numbness and tingling present on right extremities. No c/o discomfort/pain.

## 2024-12-16 NOTE — H&P ADULT - PROBLEM SELECTOR PLAN 5
DVT ppx: SCDs  Diet: regular, DASH  Dispo: pending PT/OT - Zoloft 20mg 2 weeks near menstrual period (home regimen)

## 2024-12-16 NOTE — CONSULT NOTE ADULT - SUBJECTIVE AND OBJECTIVE BOX
Surgery Consult Note  Attending: MD Lenin  Service: Thoracic Surgery    HPI: 39F no pmhx presents as a transfer for [facility] for stroke workup. On 12/13/24, patient developed "stomach bug", had nausea, vomiting, and diarrhea. States she vomiting about 10 times, had retching. Denies blood in stool or vomit. She went to sleep 12/13/24 around 11pm, which was her last known well. She woke up 12/14/24 AM w/ RIGHT arm and leg numbness and RIGHT arm and leg weakness. She went to [facility] for evaluation, where they found a LEFT occipital stroke at [facility]. On CT scans, patient found to have pneumomediastinum. Thoracic surgery consulted.       PAST MEDICAL HISTORY:  PAST MEDICAL & SURGICAL HISTORY:  No pertinent past medical history      S/P tonsillectomy          ALLERGIES:  Allergies    No Known Allergies    Intolerances        SOCIAL HISTORY: Negative for tobacco, etoh, or drug use    FAMILY HISTORY:  FAMILY HISTORY:  No pertinent family history in first degree relatives        PHYSICAL EXAM:  General: NAD, resting comfortably  HEENT: NC/AT, no neck crepitus   Pulmonary: normal resp effort  Cardiovascular: NSR  Abdominal: soft, ND/NT  Extremities: WWP  Neuro: A/O x 3    VITAL SIGNS:  Vital Signs Last 24 Hrs  T(C): 36.7 (16 Dec 2024 12:46), Max: 36.9 (16 Dec 2024 01:58)  T(F): 98 (16 Dec 2024 12:46), Max: 98.5 (16 Dec 2024 01:58)  HR: 76 (16 Dec 2024 17:19) (65 - 145)  BP: 112/79 (16 Dec 2024 17:19) (112/79 - 123/72)  BP(mean): --  RR: 18 (16 Dec 2024 17:19) (18 - 18)  SpO2: 98% (16 Dec 2024 17:19) (90% - 98%)    Parameters below as of 16 Dec 2024 17:19  Patient On (Oxygen Delivery Method): room air        I&O's Summary    16 Dec 2024 07:01  -  16 Dec 2024 20:23  --------------------------------------------------------  IN: 890 mL / OUT: 0 mL / NET: 890 mL        LABS:                        14.1   5.12  )-----------( 196      ( 16 Dec 2024 06:12 )             41.8     12-16    139  |  102  |  12  ----------------------------<  98  3.8   |  22  |  0.91    Ca    9.0      16 Dec 2024 06:14  Phos  3.5     12-16  Mg     2.3     12-16    TPro  6.9  /  Alb  4.0  /  TBili  1.1  /  DBili  x   /  AST  16  /  ALT  15  /  AlkPhos  63  12-16    PT/INR - ( 16 Dec 2024 06:13 )   PT: 11.3 sec;   INR: 0.98 ratio         PTT - ( 16 Dec 2024 06:13 )  PTT:25.0 sec  Urinalysis Basic - ( 16 Dec 2024 06:14 )    Color: x / Appearance: x / SG: x / pH: x  Gluc: 98 mg/dL / Ketone: x  / Bili: x / Urobili: x   Blood: x / Protein: x / Nitrite: x   Leuk Esterase: x / RBC: x / WBC x   Sq Epi: x / Non Sq Epi: x / Bacteria: x      CAPILLARY BLOOD GLUCOSE        LIVER FUNCTIONS - ( 16 Dec 2024 06:14 )  Alb: 4.0 g/dL / Pro: 6.9 g/dL / ALK PHOS: 63 U/L / ALT: 15 U/L / AST: 16 U/L / GGT: x

## 2024-12-16 NOTE — PHYSICAL THERAPY INITIAL EVALUATION ADULT - IMPAIRMENTS FOUND, PT EVAL
aerobic capacity/endurance/gait, locomotion, and balance/muscle strength/sensory integrity/visual motor

## 2024-12-16 NOTE — PROGRESS NOTE ADULT - PROBLEM SELECTOR PLAN 4
TBili elevated to 1.4 at [facility], unclear significance. Ravi's?  - recheck CMP here

## 2024-12-16 NOTE — PHYSICAL THERAPY INITIAL EVALUATION ADULT - GAIT DEVIATIONS NOTED, PT EVAL
decreased dany/increased time in double stance/decreased step length/decreased stride length/decreased swing-to-stance ratio/decreased weight-shifting ability

## 2024-12-16 NOTE — OCCUPATIONAL THERAPY INITIAL EVALUATION ADULT - PERTINENT HX OF CURRENT PROBLEM, REHAB EVAL
39 year old right handed female with PMHx PMDD presenting as transfer from [facility] due to acute left occipital lobe infarct. LKW on 12/13/2024 at around 23:00. On 12/14/2024 upon waking up at around 6 AM, patient noticed right upper and lower extremity numbness and weakness. Patient presented to [facility], where code stroke was initiated. NIHSS was 2 for visual field defect in the temporal aspect of the R eye and mild RUE drift. CTH w/o contrast at [facility] revealed acute left occipital lobe infarct with mild associated edema and no ICH. CT perfusion revealed moderate perfusion defect in the left posterior cerebral artery. 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 Tenecteplase was not administered as patient presented outside of window. Mechanical thrombectomy was not performed due to low NIHSS and distal occlusion. Patient was transferred to [facility] for further evaluation. Patient currently states that her right upper and lower extremity have mild tingling sensation and feel weaker compared to her left side. In addition, patient is unable to see in the periphery from her right eye and reports a posterior dull persistent headache.

## 2024-12-16 NOTE — OCCUPATIONAL THERAPY INITIAL EVALUATION ADULT - DIAGNOSIS, OT EVAL
Pt presents with pain, decreased command following, impaired cognition, decreased ROM, strength, endurance, balance, and coordination, all impacting ability to perform ADLs and functional mobility. Pt presents with pain, impaired vision decreased strength, endurance, balance, and coordination, all impacting ability to perform ADLs and functional mobility.

## 2024-12-16 NOTE — H&P ADULT - ATTENDING COMMENTS
39F w/ no prior PMH presented 12/14/24 to [facility] w/ right facial numbness iso GI illness, CT head w/ acute left occipital lobe infarct , No ICH , out of window for stroke intervention , CT angio showed occlusion of the V2 seg of right vert artery at c3-c4 possible  thrombosis or acute dissection, also noted  submucosal air tracking along deep neck soft tissue, posterior to pharynx c/w  pneumomediastinum. She was Started on asa/statin. Nuva ring removed. Labs unremarkable. EEG neg. She is now transferred to [facility] for further evaluation . Seen by neurology , will follow recommendations for stroke and hypercoagulable work up. Can obtain Vascular consult regarding concern for dissection. pneumomediastinum possibly 2/2 to Retching , can obtain CT chest AP to better characterize.

## 2024-12-16 NOTE — OCCUPATIONAL THERAPY INITIAL EVALUATION ADULT - VISUAL ASSESSMENT: EYE MUSCLE BALANCE
Cardiac Cath Lab Discharge Instructions    Groin:  -Your angiogram puncture site may be tender and/or bruised. If you have any bleeding, site becomes swollen, hard, begins oozing blood or bruising significantly increases, apply direct pressure with your hand and call 911 immediately. Do not drive yourself to the hospital.   -If your puncture site becomes reddened, warm, swollen, or more painful, has drainage, or you develop a fever greater than 101 F, contact your attending physician immediately. If unable to reach your physician, come to the [facility].   -If you experience any new shortness of breath, acute abdominal pain, sudden onset of shoulder pain or chest pain, call 911 immediately. Do not drive yourself to the hospital.   -After 24 hours, remove the dressing. Gently cleanse the site with soap and water. Pat the area dry.  -You may shower after 24 hours. Do not soak in a hot tub, tub bath, or swimming pool until the skin site is healed (usually 5-7 days). Soaking in water can increase the risk of infection at the insertion site.  -No heavy lifting/pushing (greater than 5-10 pounds) for the next 3-4 days.  -Avoid bending at the hip, climbing, or other vigorous activity. Keep affected leg extended on the way home and at home for the first 24 hours.    Vascular Closure Device  -If you have received a closure device such as Angio-Seal, Perclose, or Vascade, you may feel a pea sized lump and/or mild tenderness for the first 24 hours.           Activity  It is recommended to have an adult stay with you for the first 24 hours.  Rest at home for the next 24 hours. Keep your activity limited.  You may resume driving and limited activity     Post Sedation Instructions  We strongly recommend having a responsible adult stay with you for the next 24 hours. You may feel sleepy for the next 24 hours.  For the next 24 hours:  Do NOT drink alcoholic beverages.  Do NOT smoke.  Do NOT make important  decisions or sign important papers.  Do NOT drive a car, operate machinery, or power tools.       Diet/medications   -Resume previous diet and medications upon returning home, unless your physician has specified otherwise.  -In the first 24-48 hours after the procedure, drink plenty of fluids to flush the x-ray dye through your system.  -If you are taking Metformin, or a medication containing metformin (I.e. Glucophage, Glucovance, Avandamet, or Metaglip), do not resume taking this medication for 48 hours after the procedure.    normal

## 2024-12-16 NOTE — H&P ADULT - PROBLEM SELECTOR PLAN 2
Unclear etiology of pneumomediastinum - possibly 2/2 significant retching from previous n/v?  - f/u CT chest as above  - CXR obtained on admission  - consider thoracic surgery eval in AM

## 2024-12-16 NOTE — H&P ADULT - NSHPLABSRESULTS_GEN_ALL_CORE
LABS:  Ordered, in progress        I&O's Summary    BNP    RADIOLOGY & ADDITIONAL STUDIES:  See above for report from [facility]    TELEMETRY: reviewed  Sinus tach 100s, no events    EKG: ordered, pending LABS:  Ordered, in progress    [facility] labs  CBC 12/15/24 00:19 6.25/13.8/213  ESR 12/15/24 19  UA 12/15/24 - negative  [facility] 12/14/24 - Negative  Lipid panel 12/14/24 - Cholesterol 331, Tg 114, ,   CMP 12/15/24 00:19: 137/4.0/106/25/12/0.7/97 ALT 19, AST23, TBili 1.4  CRP 10.6  UTox 12/15/24 Negative        I&O's Summary    BNP    RADIOLOGY & ADDITIONAL STUDIES:  See above for report from [facility]    TELEMETRY: reviewed  Sinus tach 100s, no events    EKG: NSR, no ST deviations

## 2024-12-16 NOTE — CONSULT NOTE ADULT - SUBJECTIVE AND OBJECTIVE BOX
Neurology - Consult Note    -  Spectra: [phone] ([facility]), [phone] ([facility])  -    HPI: Patient JUSTICE FRANK is a 39F with no PMHx presenting as transfer from [facility] due to acute left occipital lobe infarct. LKW on 12/13/2024 at around 23:00. On 12/14/2024 AM, patient noticed right upper and lower extremity numbness and weakness. Patient presented to [facility], where code stroke was initiated. NIHSS was 2 for visual field defect in the temporal aspect of the R eye and mild RUE drift. CTH w/o contrast at [facility] revealed acute left occipital lobe infarct with mild associated edema and no ICH. CT perfusion revealed moderate perfusion defect in the left posterior cerebral artery. CTA H/N revealed occlusion of the V2 segment of the right vertebral artery, occlusion of the P2 segment of the left posterior cerbral artery with diminished vascularity in the left inferior PCA territory. Tenecteplase was not administered as patient presented outside of window. Mechanical thrombectomy was not performed due to low NIHSS and distal occlusion.       Review of Systems:  INCOMPLETE   CONSTITUTIONAL: No fevers or chills  EYES AND ENT: No visual changes or no throat pain   NECK: No pain or stiffness  RESPIRATORY: No hemoptysis or shortness of breath  CARDIOVASCULAR: No chest pain or palpitations  GASTROINTESTINAL: No melena or hematochezia  GENITOURINARY: No dysuria or hematuria  NEUROLOGICAL: +As stated in HPI above  SKIN: No itching, burning, rashes, or lesions   All other review of systems is negative unless indicated above.    Allergies:  No Known Allergies      PMHx/PSHx/Family Hx: As above, otherwise see below       Social Hx:  No current use of tobacco, alcohol, or illicit drugs  Lives with ***    Medications:  MEDICATIONS  (STANDING):  atorvastatin 80 milliGRAM(s) Oral at bedtime  pantoprazole    Tablet 40 milliGRAM(s) Oral before breakfast    MEDICATIONS  (PRN):  acetaminophen     Tablet .. 650 milliGRAM(s) Oral every 6 hours PRN Temp greater or equal to 38C (100.4F), Mild Pain (1 - 3)  FLUoxetine 20 milliGRAM(s) Oral daily PRN for premenstrual dysphoria disorder      Vitals:  T(C): 36.9 (12-16-24 @ 01:58), Max: 36.9 (12-16-24 @ 01:58)  HR: 80 (12-16-24 @ 01:58) (80 - 80)  BP: 122/66 (12-16-24 @ 01:58) (122/66 - 122/66)  RR: 18 (12-16-24 @ 01:58) (18 - 18)  SpO2: 97% (12-16-24 @ 01:58) (97% - 97%)    Physical Examination: INCOMPLETE  General - NAD  Cardiovascular - Peripheral pulses palpable, no edema  Eyes - Fundoscopy with flat, sharp optic discs and no hemorrhage or exudates; Fundoscopy not well visualized; Fundoscopy not performed due to safety precautions in the setting of the COVID-19 pandemic    Neurologic Exam:  Mental status - Eyes open, attending to examiner. Awake, Alert, Oriented to person, place, and time. Speech fluent, repetition and naming intact. Follows simple and complex commands. Attention/concentration, recent and remote memory (including registration and recall), and fund of knowledge intact    Cranial nerves - PERRLA, VFF, EOMI, face sensation (V1-V3) intact b/l, facial strength intact without asymmetry b/l, hearing intact b/l, palate with symmetric elevation, trapezius 5/5 strength b/l, tongue midline on protrusion with full lateral movement    Motor - Normal bulk and tone throughout. No pronator drift.  Strength testing            Deltoid      Biceps      Triceps     Wrist Extension    Wrist Flexion     Interossei         R            5                 5               5                     5                            5                        5                 5  L             5                 5               5                     5                            5                        5                 5              Hip Flexion    Hip Extension    Knee Flexion    Knee Extension    Dorsiflexion    Plantar Flexion  R              5                           5                       5                           5                            5                          5  L              5                           5                        5                           5                            5                          5    Sensation - Light touch intact throughout    DTR's -             Biceps      Triceps     Brachioradialis      Patellar    Ankle    Toes/plantar response  R             2+             2+                  2+                       2+            2+                 Down  L              2+             2+                 2+                        2+           2+                 Down    Coordination - Finger to Nose intact b/l. No tremors appreciated    Gait and station - Normal casual gait. Romberg (-)    Labs:          CAPILLARY BLOOD GLUCOSE              CSF:                  Radiology:     Neurology - Consult Note    -  Spectra: [phone] ([facility]), [phone] ([facility])  -    HPI: 39 year old right handed female with PMHx PMDD presenting as transfer from [facility] due to acute left occipital lobe infarct. LKW on 12/13/2024 at around 23:00. On 12/14/2024 upon waking up at around 6 AM, patient noticed right upper and lower extremity numbness and weakness. Patient presented to [facility], where code stroke was initiated. NIHSS was 2 for visual field defect in the temporal aspect of the R eye and mild RUE drift. CTH w/o contrast at [facility] revealed acute left occipital lobe infarct with mild associated edema and no ICH. CT perfusion revealed moderate perfusion defect in the left posterior cerebral artery. 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 Tenecteplase was not administered as patient presented outside of window. Mechanical thrombectomy was not performed due to low NIHSS and distal occlusion. Patient was transferred to [facility] for further evaluation. Patient currently states that her right upper and lower extremity have mild tingling sensation and feel weaker compared to her left side. In addition, patient is unable to see in the periphery from her right eye and reports a posterior dull persistent headache. Reports having used Nuvaring for >5 years but was discontinued by OBGYN at [facility], denies history of miscarriages, has 2 children, and denies family history of hypercoagulable disorders. Ambulates independently at baseline, handles ADLs and iADLs independently.       Review of Systems:   CONSTITUTIONAL: No fevers or chills  EYES AND ENT: + visual changes or no throat pain   NECK: No pain or stiffness  RESPIRATORY: No hemoptysis or shortness of breath  CARDIOVASCULAR: No chest pain or palpitations  GASTROINTESTINAL: No melena or hematochezia  GENITOURINARY: No dysuria or hematuria  NEUROLOGICAL: +As stated in HPI above  SKIN: No itching or burning  All other review of systems is negative unless indicated above.    Allergies:  No Known Allergies      PMHx/PSHx/Family Hx: As above, otherwise see below       Social Hx:  No current use of tobacco, alcohol, or illicit drugs  Lives with family at home    Medications:  MEDICATIONS  (STANDING):  atorvastatin 80 milliGRAM(s) Oral at bedtime  pantoprazole    Tablet 40 milliGRAM(s) Oral before breakfast    MEDICATIONS  (PRN):  acetaminophen     Tablet .. 650 milliGRAM(s) Oral every 6 hours PRN Temp greater or equal to 38C (100.4F), Mild Pain (1 - 3)  FLUoxetine 20 milliGRAM(s) Oral daily PRN for premenstrual dysphoria disorder      Vitals:  T(C): 36.9 (12-16-24 @ 01:58), Max: 36.9 (12-16-24 @ 01:58)  HR: 80 (12-16-24 @ 01:58) (80 - 80)  BP: 122/66 (12-16-24 @ 01:58) (122/66 - 122/66)  RR: 18 (12-16-24 @ 01:58) (18 - 18)  SpO2: 97% (12-16-24 @ 01:58) (97% - 97%)    Physical Examination:   General - NAD, lying comfortably in bed  Cardiovascular - no edema    Neurologic Exam:  Mental status - Eyes closed, opens to voice, then attends to examiner. Oriented to person, place, and time. Speech fluent, repetition and naming intact. Follows simple commands. Fund of knowledge intact    Cranial nerves - PERRL, right homonymous hemianopsia , EOMI, face sensation (V1-V3) intact b/l, facial strength intact without asymmetry b/l, hearing intact b/l, palate with symmetric elevation, trapezius 5/5 strength b/l, tongue midline on protrusion with full lateral movement    Motor - Decreased bulk throughout. No pronator drift.  Strength testing            Deltoid      Biceps      Triceps     Wrist Extension    Wrist Flexion     Interossei         R            4+               4+             5                     4+                4+                 4+               4+  L             5                 5               5                     5                   5                   5                 5              Hip Flexion    Hip Extension    Knee Flexion    Knee Extension    Dorsiflexion    Plantar Flexion  R              4+                        4+                       5                 5                            5                          5  L              5                           5                        5                   5                            5                          5    Sensation - Light touch intact throughout.     DTR's -             Biceps      Triceps     Brachioradialis      Patellar    Ankle      R             3+             3+                  3+                3+            2+                   L              3+             3+                 3+                 3+           2+                   Coordination - Finger to Nose intact b/l. No tremors appreciated. No ataxia on heel to shin.     Gait and station - Normal casual gait.     [facility] SS:  DATE: 12/15/2024  TIME: 4:40 AM  1A: Level of consciousness (0-3): 0  1B: Questions (0-2): 0    1C: Commands (0-2): 0  2: Gaze (0-2): 0  3: Visual fields (0-3): 2  4: Facial palsy (0-3): 0  MOTOR:  5A: Left arm motor drift (0-4): 0  5B: Right arm motor drift (0-4): 0  6A: Left leg motor drift (0-4): 0  6B: Right leg motor drift (0-4): 0  7: Limb ataxia (0-2): 0  SENSORY:  8: Sensation (0-2): 0  SPEECH:  9: Language (0-3): 0  10: Dysarthria (0-2): 0  EXTINCTION:  11: Extinction/inattention (0-2): 0    TOTAL SCORE: 2    Labs:          CAPILLARY BLOOD GLUCOSE              CSF:                  Radiology:     Yes

## 2024-12-16 NOTE — H&P ADULT - HISTORY OF PRESENT ILLNESS
INCOMPLETE INCOMPLETE    Review of [facility] records  CTH noncon - acute left occipital lobe infarct w/ mild associated edema. No evidence of acute intracranial hemorrhage. Submucosal air tracking along the deep neck soft tissue posterior to the pharynx, related to pneumomediastinum visualized on same day CTA neck  CT head brain perfusion: moderate perfusion defect in the left posterior cerebral artery territory  CTA head and neck: occlusion of the V2 segment of the right vertebral artery at C3-C4 may reflect acute thrombosis or dissection. Reconstitution at the V4 segment likely reflects retrograde filling. Occlusion of the P2 segment of the left posterior cerebral artery with diminished vascularity in the left inferior PCA territory. Partially visualized pneumomediastinum tracking to the neck within the deep neck soft tissue 39F no pmhx presents as a transfer for [facility] for stroke workup. On 12/13/24, patient developed "stomach bug", had nausea, vomiting, and diarrhea. States she vomiting about 10 times, had retching. Denies blood in stool or vomit. She went to sleep 12/13/24 around 11pm, which was her last known well. She woke up 12/14/24 AM w/ RIGHT arm and leg numbness and RIGHT arm and leg weakness. She went to [facility] for evaluation, where they found a LEFT occipital infarct w/ LEFT PCA perfusion defect and vertebral thrombosis vs. dissection. She was also noted to have pneumomediastinum. She was out of the window for TNK, so it was not given. She was monitored at [facility] and then transferred to [facility] for further eval. She denies additional new symptoms now; continues ot have RIGHT arm and leg numbness and weakness. Diarrhea and vomiting have since resolved.    Review of [facility] records  CTH noncon - acute left occipital lobe infarct w/ mild associated edema. No evidence of acute intracranial hemorrhage. Submucosal air tracking along the deep neck soft tissue posterior to the pharynx, related to pneumomediastinum visualized on same day CTA neck  CT head brain perfusion: moderate perfusion defect in the left posterior cerebral artery territory  CTA head and neck: occlusion of the V2 segment of the right vertebral artery at C3-C4 may reflect acute thrombosis or dissection. Reconstitution at the V4 segment likely reflects retrograde filling. Occlusion of the P2 segment of the left posterior cerebral artery with diminished vascularity in the left inferior PCA territory. Partially visualized pneumomediastinum tracking to the neck within the deep neck soft tissue 39F no pmhx presents as a transfer for [facility] for stroke workup. On 12/13/24, patient developed "stomach bug", had nausea, vomiting, and diarrhea. States she vomiting about 10 times, had retching. Denies blood in stool or vomit. She went to sleep 12/13/24 around 11pm, which was her last known well. She woke up 12/14/24 AM w/ RIGHT arm and leg numbness and RIGHT arm and leg weakness. She went to [facility] for evaluation, where they found a LEFT occipital infarct w/ LEFT PCA perfusion defect and vertebral thrombosis vs. dissection. She was also noted to have pneumomediastinum. She was out of the window for TNK, so it was not given. She was monitored at [facility] and then transferred to [facility] for further eval. She denies additional new symptoms now; continues ot have RIGHT arm and leg numbness and weakness. Diarrhea and vomiting have since resolved. Denies vision changes.    Review of [facility] records  CTH noncon - acute left occipital lobe infarct w/ mild associated edema. No evidence of acute intracranial hemorrhage. Submucosal air tracking along the deep neck soft tissue posterior to the pharynx, related to pneumomediastinum visualized on same day CTA neck  CT head brain perfusion: moderate perfusion defect in the left posterior cerebral artery territory  CTA head and neck: occlusion of the V2 segment of the right vertebral artery at C3-C4 may reflect acute thrombosis or dissection. Reconstitution at the V4 segment likely reflects retrograde filling. Occlusion of the P2 segment of the left posterior cerebral artery with diminished vascularity in the left inferior PCA territory. Partially visualized pneumomediastinum tracking to the neck within the deep neck soft tissue

## 2024-12-16 NOTE — PATIENT PROFILE ADULT - FLU SEASON?
Yes... H Plasty Text: Given the location of the defect, shape of the defect and the proximity to free margins a H-plasty was deemed most appropriate for repair.  Using a sterile surgical marker, the appropriate advancement arms of the H-plasty were drawn incorporating the defect and placing the expected incisions within the relaxed skin tension lines where possible. The area thus outlined was incised deep to adipose tissue with a #15 scalpel blade. The skin margins were undermined to an appropriate distance in all directions utilizing iris scissors.  The opposing advancement arms were then advanced into place in opposite direction and anchored with interrupted buried subcutaneous sutures.

## 2024-12-16 NOTE — H&P ADULT - ASSESSMENT
39F no pmhx presents as a transfer for [facility] for stroke workup. 39F no pmhx presents as a transfer for [facility] for stroke workup, found to have LEFT occipital stroke at [facility].

## 2024-12-16 NOTE — PHYSICAL THERAPY INITIAL EVALUATION ADULT - IMPAIRMENTS CONTRIBUTING TO GAIT DEVIATIONS, PT EVAL
decrease endurance ; pt walked to BR cg of1 sat on toilet + urinate x few min supervision , pt then amb back to bed cg of 1 pt wish to return to bed yessenia Snow now with pt pt semi supine in bed hr back to 80's sinus ; rn to take temp/impaired balance/impaired postural control/decreased sensation/impaired sensory feedback/decreased strength

## 2024-12-16 NOTE — PHYSICAL THERAPY INITIAL EVALUATION ADULT - ADDITIONAL COMMENTS
MARINA dickson R handed ; pt lives in house with 5 steps to enter with HR and house is [place] so 5 steps to main level with HR as well (pt can function on one level of home (basement is 5 steps down with HR to laundry and den /office ) ; pt lives with spouse and 2 children ages 7 and 11 ) , ;pt works in sales ; pt independent PTA no AD

## 2024-12-16 NOTE — CONSULT NOTE ADULT - TIME BILLING
39-year-old right-handed lady evaluated at [facility] on 12/16/2024 with aphasia and right visual field deficit.  History and exam as above, with minor changes.  ROS otherwise negative.  Exam.  Alert and attentive; speech with only extremely subtle and rare hesitancy, and possibly subtle circumlocutions; followed crossed commands; naming and repetition intact; right superior quadrantanopia; remainder of neurologic exam was nonfocal.  LDL = 171    Impression.  Her neurologic exam shows probable subtle ill-defined aphasia, with subtle hesitancy or circumlocutions and a right superior quadrantanopia.  To summarize, on or about 12/12/2024 she had gastroenteritis with severe and repeated vomiting.  On or about 12/13/2024 she was texting her , and her  noted that the texts did not make sense, and consisted mostly of random letters and called EMS and went to [facility].  She noted right posterior neck pain.  At [facility], by report, neuroimaging showed a left PCA infarct; right vertebral artery occlusion at the V2 segment and a left P2 occlusion.  While uncertain, I suspect that she might have had a right vertebral dissection as the mechanism of her stroke, but this remains to be confirmed.  Pending clarification of the diagnosis, the relevance of the NUVA ring remains indeterminate.  Plan.  Will follow-up MRI brain/MRA neck and head; if necessary, will add T1-weighted fat-sat sequence; if MR imaging confirms right vertebral dissection, then no role for further neurovascular investigation; if there is no evidence of dissection, then will need further workup for stroke in the young; for now, we will treat per ATAMIS with aspirin/Plavix for 3 weeks, followed by aspirin alone; if dissection is confirmed, may change antiplatelet agents to apixaban for at least the next 3 months; statin can be considered based on her 10-year ASCVD risk calculation; PT/OT/speech therapy.

## 2024-12-16 NOTE — OCCUPATIONAL THERAPY INITIAL EVALUATION ADULT - PLANNED THERAPY INTERVENTIONS, OT EVAL
I will call you with your ultrasound results.  Try and not push on site as I think that may be causing it to be more painful.  Return if symptoms worsen or fail to improve.  
ADL retraining/balance training/bed mobility training/cognitive, visual perceptual/fine motor coordination training/transfer training

## 2024-12-16 NOTE — OCCUPATIONAL THERAPY INITIAL EVALUATION ADULT - FINE MOTOR COORDINATION, RIGHT HAND THUMB/FINGER OPPOSITION SKILLS, OT EVAL
Chief Complaint   Patient presents with    Follow-up     diabetic       Health Maintenance Due   Topic    Eye Exam Retinal or Dilated     Medicare Yearly Exam         1. Have you been to the ER, urgent care clinic since your last visit? Hospitalized since your last visit? Yes 07/19/22- finger blister- [facility]    2. Have you seen or consulted any other health care providers outside of the [address] [place] [address] since your last visit? Include any pap smears or colon screening.  No    Visit Vitals  BP (!) 152/69 (BP 1 Location: Left upper arm, BP Patient Position: Sitting, BP Cuff Size: Adult long)   Pulse 65   Temp 97.1 °F (36.2 °C) (Temporal)   Resp 16   Ht 5' 9\" (1.753 m)   Wt 225 lb 3.2 oz (102.2 kg)   SpO2 99%   BMI 33.26 kg/m² normal performance

## 2024-12-16 NOTE — H&P ADULT - PROBLEM SELECTOR PLAN 4
- Zoloft 20mg 2 weeks near menstrual period (home regimen) TBili elevated to 1.4 at [facility], unclear significance. Ravi's?  - recheck CMP here

## 2024-12-16 NOTE — CONSULT NOTE ADULT - ASSESSMENT
39F no pmhx presents as a transfer for [facility] for stroke workup. On 12/13/24, patient developed "stomach bug", had nausea, vomiting, and diarrhea. States she vomiting about 10 times, had retching. Denies blood in stool or vomit. She went to sleep 12/13/24 around 11pm, which was her last known well. She woke up 12/14/24 AM w/ RIGHT arm and leg numbness and RIGHT arm and leg weakness. She went to [facility] for evaluation, where they found a LEFT occipital stroke at [facility]. On CT scans, patient found to have pneumomediastinum. Thoracic surgery consulted.     Plan:   - Patient currently asymptomatic   - Would make patient NPO and obtain a gastrograffin esophagram to rule out esophageal injury   - Thoracic surgery to follow     Discussed with attending, Dr. Phelps     Thoracic Surgery   [id number]

## 2024-12-16 NOTE — PHYSICAL THERAPY INITIAL EVALUATION ADULT - IMPAIRED TRANSFERS: SIT/STAND, REHAB EVAL
decrease endurance , sit -stand from bed cg of 1 to close supervision , stood in place x 5 min pt felt warmer , lightheaded and waves of nausea BP stand 116/76 HR up to 145 Sinus tach on tele per rn Go who checked tele ,; Pt then sat on bed rested x few min HR lower in 120's ; pt need to use BR per rn ok to be assisted to BR ;/impaired balance/impaired coordination/impaired postural control/decreased sensation/impaired sensory feedback/decreased strength

## 2024-12-16 NOTE — PHYSICAL THERAPY INITIAL EVALUATION ADULT - BED MOBILITY LIMITATIONS, REHAB EVAL
using r and L ue's with a feeling of being clumbsy with r hand/decreased ability to use arms for pushing/pulling

## 2024-12-16 NOTE — H&P ADULT - NSHPREVIEWOFSYSTEMS_GEN_ALL_CORE
REVIEW OF SYSTEMS:    CONSTITUTIONAL: No weakness, fevers or chills  EYES/ENT: No visual changes;  No vertigo or throat pain   NECK: No pain or stiffness  RESPIRATORY: No cough, wheezing, hemoptysis; No shortness of breath  CARDIOVASCULAR: No chest pain or palpitations  GASTROINTESTINAL: No abdominal or epigastric pain. No nausea, vomiting, or hematemesis; No diarrhea or constipation. No melena or hematochezia.  GENITOURINARY: No dysuria, frequency or hematuria  NEUROLOGICAL: as per HPI  SKIN: No itching, burning, rashes, or lesions   All other review of systems is negative unless indicated above.

## 2024-12-16 NOTE — OCCUPATIONAL THERAPY INITIAL EVALUATION ADULT - LIVES WITH, PROFILE
Pt lives in a house with her  and 2 kids (11 & 8 y/o). 5 steps to enter, 5+5 inside, IND PTA without AD.

## 2024-12-16 NOTE — PROGRESS NOTE ADULT - ASSESSMENT
39F no pmhx presents as a transfer for [facility] for stroke workup, found to have LEFT occipital stroke at [facility].

## 2024-12-16 NOTE — PROGRESS NOTE ADULT - ATTENDING COMMENTS
Agree with above note by R1 Dr. Curtis incl findings and plan, edited where appropriate  seen and examined with family at bedside  pt does appear well and essentially without complaints except heaviness of R side   exam is very close to normal, very mild R pronator drift  "CVA in young pt" w/u as per neuro - pending imaging, LOREN, and hypercoagulable w/u  noted elev LDL - pt does have family hx of CVA (but no one in early 40s), cholesterol - outpt lipid specialist referral, will be started on lipitor as part of cva mgmt  questions answered    Attending Physician Dr. Sly Nichole - available on [employer] Teams

## 2024-12-17 LAB
ALBUMIN SERPL ELPH-MCNC: 3.9 G/DL — SIGNIFICANT CHANGE UP (ref 3.3–5)
ALP SERPL-CCNC: 65 U/L — SIGNIFICANT CHANGE UP (ref 40–120)
ALT FLD-CCNC: 13 U/L — SIGNIFICANT CHANGE UP (ref 10–45)
ANION GAP SERPL CALC-SCNC: 15 MMOL/L — SIGNIFICANT CHANGE UP (ref 5–17)
APTT BLD: 26.8 SEC — SIGNIFICANT CHANGE UP (ref 24.5–35.6)
AST SERPL-CCNC: 14 U/L — SIGNIFICANT CHANGE UP (ref 10–40)
B2 GLYCOPROT1 IGA SER QL: <2 U/ML — SIGNIFICANT CHANGE UP
B2 GLYCOPROT1 IGG SER-ACNC: <1.4 U/ML — SIGNIFICANT CHANGE UP
B2 GLYCOPROT1 IGM SER-ACNC: <1.5 U/ML — SIGNIFICANT CHANGE UP
BASOPHILS # BLD AUTO: 0.02 K/UL — SIGNIFICANT CHANGE UP (ref 0–0.2)
BASOPHILS NFR BLD AUTO: 0.4 % — SIGNIFICANT CHANGE UP (ref 0–2)
BILIRUB SERPL-MCNC: 1.4 MG/DL — HIGH (ref 0.2–1.2)
BUN SERPL-MCNC: 12 MG/DL — SIGNIFICANT CHANGE UP (ref 7–23)
CALCIUM SERPL-MCNC: 9.2 MG/DL — SIGNIFICANT CHANGE UP (ref 8.4–10.5)
CARDIOLIPIN IGM SER-MCNC: <1.5 MPL U/ML — SIGNIFICANT CHANGE UP
CARDIOLIPIN IGM SER-MCNC: <1.6 GPL U/ML — SIGNIFICANT CHANGE UP
CHLORIDE SERPL-SCNC: 99 MMOL/L — SIGNIFICANT CHANGE UP (ref 96–108)
CO2 SERPL-SCNC: 23 MMOL/L — SIGNIFICANT CHANGE UP (ref 22–31)
CREAT SERPL-MCNC: 0.97 MG/DL — SIGNIFICANT CHANGE UP (ref 0.5–1.3)
DEPRECATED CARDIOLIPIN IGA SER: <2 APL U/ML — SIGNIFICANT CHANGE UP
EGFR: 75 ML/MIN/1.73M2 — SIGNIFICANT CHANGE UP
EOSINOPHIL # BLD AUTO: 0.07 K/UL — SIGNIFICANT CHANGE UP (ref 0–0.5)
EOSINOPHIL NFR BLD AUTO: 1.3 % — SIGNIFICANT CHANGE UP (ref 0–6)
GLUCOSE SERPL-MCNC: 85 MG/DL — SIGNIFICANT CHANGE UP (ref 70–99)
HCG SERPL-ACNC: <2 MIU/ML — SIGNIFICANT CHANGE UP
HCT VFR BLD CALC: 42.6 % — SIGNIFICANT CHANGE UP (ref 34.5–45)
HGB BLD-MCNC: 14.5 G/DL — SIGNIFICANT CHANGE UP (ref 11.5–15.5)
IMM GRANULOCYTES NFR BLD AUTO: 0.2 % — SIGNIFICANT CHANGE UP (ref 0–0.9)
LYMPHOCYTES # BLD AUTO: 1.3 K/UL — SIGNIFICANT CHANGE UP (ref 1–3.3)
LYMPHOCYTES # BLD AUTO: 24.6 % — SIGNIFICANT CHANGE UP (ref 13–44)
MAGNESIUM SERPL-MCNC: 2.1 MG/DL — SIGNIFICANT CHANGE UP (ref 1.6–2.6)
MCHC RBC-ENTMCNC: 28.7 PG — SIGNIFICANT CHANGE UP (ref 27–34)
MCHC RBC-ENTMCNC: 34 G/DL — SIGNIFICANT CHANGE UP (ref 32–36)
MCV RBC AUTO: 84.2 FL — SIGNIFICANT CHANGE UP (ref 80–100)
MONOCYTES # BLD AUTO: 0.5 K/UL — SIGNIFICANT CHANGE UP (ref 0–0.9)
MONOCYTES NFR BLD AUTO: 9.5 % — SIGNIFICANT CHANGE UP (ref 2–14)
NEUTROPHILS # BLD AUTO: 3.39 K/UL — SIGNIFICANT CHANGE UP (ref 1.8–7.4)
NEUTROPHILS NFR BLD AUTO: 64 % — SIGNIFICANT CHANGE UP (ref 43–77)
NRBC # BLD: 0 /100 WBCS — SIGNIFICANT CHANGE UP (ref 0–0)
PHOSPHATE SERPL-MCNC: 4.2 MG/DL — SIGNIFICANT CHANGE UP (ref 2.5–4.5)
PLATELET # BLD AUTO: 195 K/UL — SIGNIFICANT CHANGE UP (ref 150–400)
POTASSIUM SERPL-MCNC: 3.6 MMOL/L — SIGNIFICANT CHANGE UP (ref 3.5–5.3)
POTASSIUM SERPL-SCNC: 3.6 MMOL/L — SIGNIFICANT CHANGE UP (ref 3.5–5.3)
PROT SERPL-MCNC: 6.9 G/DL — SIGNIFICANT CHANGE UP (ref 6–8.3)
RBC # BLD: 5.06 M/UL — SIGNIFICANT CHANGE UP (ref 3.8–5.2)
RBC # FLD: 12.7 % — SIGNIFICANT CHANGE UP (ref 10.3–14.5)
SODIUM SERPL-SCNC: 137 MMOL/L — SIGNIFICANT CHANGE UP (ref 135–145)
WBC # BLD: 5.29 K/UL — SIGNIFICANT CHANGE UP (ref 3.8–10.5)
WBC # FLD AUTO: 5.29 K/UL — SIGNIFICANT CHANGE UP (ref 3.8–10.5)

## 2024-12-17 PROCEDURE — 99232 SBSQ HOSP IP/OBS MODERATE 35: CPT

## 2024-12-17 PROCEDURE — 70450 CT HEAD/BRAIN W/O DYE: CPT | Mod: 26

## 2024-12-17 PROCEDURE — 74220 X-RAY XM ESOPHAGUS 1CNTRST: CPT | Mod: 26

## 2024-12-17 PROCEDURE — 99233 SBSQ HOSP IP/OBS HIGH 50: CPT

## 2024-12-17 RX ORDER — SODIUM CHLORIDE 9 MG/ML
1000 INJECTION, SOLUTION INTRAMUSCULAR; INTRAVENOUS; SUBCUTANEOUS
Refills: 0 | Status: DISCONTINUED | OUTPATIENT
Start: 2024-12-17 | End: 2024-12-18

## 2024-12-17 RX ADMIN — ACETAMINOPHEN 500MG 650 MILLIGRAM(S): 500 TABLET, COATED ORAL at 22:30

## 2024-12-17 RX ADMIN — SODIUM CHLORIDE 100 MILLILITER(S): 9 INJECTION, SOLUTION INTRAMUSCULAR; INTRAVENOUS; SUBCUTANEOUS at 12:02

## 2024-12-17 RX ADMIN — Medication 80 MILLIGRAM(S): at 21:12

## 2024-12-17 RX ADMIN — ACETAMINOPHEN 500MG 650 MILLIGRAM(S): 500 TABLET, COATED ORAL at 21:33

## 2024-12-17 RX ADMIN — ENOXAPARIN SODIUM 40 MILLIGRAM(S): 30 INJECTION SUBCUTANEOUS at 06:00

## 2024-12-17 NOTE — PROGRESS NOTE ADULT - PROBLEM SELECTOR PLAN 1
- Patient currently asymptomatic   -  NPO   -  gastrograffin esophagram to rule out esophageal injury > done this am  - Thoracic surgery to follow

## 2024-12-17 NOTE — PROGRESS NOTE ADULT - SUBJECTIVE AND OBJECTIVE BOX
Subjective:      V/S  T(C): 37.1 (12-17-24 @ 05:06), Max: 37.7 (12-17-24 @ 00:24)  HR: 95 (12-17-24 @ 05:06) (65 - 145)  BP: 120/81 (12-17-24 @ 05:06) (110/76 - 123/79)  RR: 18 (12-17-24 @ 05:06) (18 - 18)  SpO2: 97% (12-17-24 @ 05:06) (90% - 99%)      I&O's Detail    16 Dec 2024 07:01  -  17 Dec 2024 07:00  --------------------------------------------------------  IN:    Oral Fluid: 890 mL  Total IN: 890 mL    OUT:  Total OUT: 0 mL    Total NET: 890 mL          12-16-24 @ 07:01  -  12-17-24 @ 07:00  --------------------------------------------------------  IN: 890 mL / OUT: 0 mL / NET: 890 mL      MEDICATIONS  (STANDING):  atorvastatin 80 milliGRAM(s) Oral at bedtime  enoxaparin Injectable 40 milliGRAM(s) SubCutaneous every 24 hours  pantoprazole    Tablet 40 milliGRAM(s) Oral before breakfast      12-17    137  |  99  |  12  ----------------------------<  85  3.6   |  23  |  0.97    Ca    9.2      17 Dec 2024 06:42  Phos  4.2     12-17  Mg     2.1     12-17    TPro  6.9  /  Alb  3.9  /  TBili  1.4[H]  /  DBili  x   /  AST  14  /  ALT  13  /  AlkPhos  65  12-17                               14.5   5.29  )-----------( 195      ( 17 Dec 2024 06:40 )             42.6        PT/INR - ( 16 Dec 2024 06:13 )   PT: 11.3 sec;   INR: 0.98 ratio         PTT - ( 17 Dec 2024 06:41 )  PTT:26.8 sec         CAPILLARY BLOOD GLUCOSE               Physical Exam:    General: Age-appearing, in no acute distress  Cardiovascular: +S1, S2; Regular rate and rhythm, no murmurs, rubs, gallops  Respiratory: CTA BL, no wheezes, rales, rhonchi  Gastrointestinal: Abdomen soft, non-tender, +BS in all 4 quadrants  Extremities: No clubbing, cyanosis, or edema  Neuro: Non-focal, AAOx4, sensation intact BL          PAST MEDICAL & SURGICAL HISTORY:  No pertinent past medical history      S/P tonsillectomy               Subjective: "Hello"  sitting up in bed  family at bedside      V/S  T(C): 37.1 (12-17-24 @ 05:06), Max: 37.7 (12-17-24 @ 00:24)  HR: 95 (12-17-24 @ 05:06) (65 - 145)  BP: 120/81 (12-17-24 @ 05:06) (110/76 - 123/79)  RR: 18 (12-17-24 @ 05:06) (18 - 18)  SpO2: 97% (12-17-24 @ 05:06) (90% - 99%)      I&O's Detail    16 Dec 2024 07:01  -  17 Dec 2024 07:00  --------------------------------------------------------  IN:    Oral Fluid: 890 mL  Total IN: 890 mL    OUT:  Total OUT: 0 mL    Total NET: 890 mL          12-16-24 @ 07:01  -  12-17-24 @ 07:00  --------------------------------------------------------  IN: 890 mL / OUT: 0 mL / NET: 890 mL      MEDICATIONS  (STANDING):  atorvastatin 80 milliGRAM(s) Oral at bedtime  enoxaparin Injectable 40 milliGRAM(s) SubCutaneous every 24 hours  pantoprazole    Tablet 40 milliGRAM(s) Oral before breakfast      12-17    137  |  99  |  12  ----------------------------<  85  3.6   |  23  |  0.97    Ca    9.2      17 Dec 2024 06:42  Phos  4.2     12-17  Mg     2.1     12-17    TPro  6.9  /  Alb  3.9  /  TBili  1.4[H]  /  DBili  x   /  AST  14  /  ALT  13  /  AlkPhos  65  12-17                               14.5   5.29  )-----------( 195      ( 17 Dec 2024 06:40 )             42.6        PT/INR - ( 16 Dec 2024 06:13 )   PT: 11.3 sec;   INR: 0.98 ratio         PTT - ( 17 Dec 2024 06:41 )  PTT:26.8 sec             Physical Exam:    General: Age-appearing, in no acute distress  Cardiovascular: +S1, S2; Regular rate and rhythm, no murmurs, rubs, gallops  Respiratory: CTA BL, no wheezes, rales, rhonchi  Gastrointestinal: Abdomen soft, non-tender, +BS in all 4 quadrants  Extremities: No  edema  Neuro: Non-focal, AAOx4, sensation intact BL          PAST MEDICAL & SURGICAL HISTORY:  No pertinent past medical history      S/P tonsillectomy

## 2024-12-17 NOTE — PROGRESS NOTE ADULT - SUBJECTIVE AND OBJECTIVE BOX
*************************************  NEUROLOGY PROGRESS NOTE  **************************************    JUSTICE FRANK  Female  MRN-[id number]    Subjective: While mentioning regarding reason for hospitalization she had some word errors where she mentioned headache instead of neck pain, eye pain instead of blurry vision. She continues to have right blurry vision and right neck pain. Has some mild throat pain but denies nausea, vomiting.       -------------------------------------------------------------------------------------------------------------------------------------------------------------------------------------------------------------------------    Objective:    VITAL SIGNS:  Vital Signs Last 24 Hrs  T(C): 37.1 (17 Dec 2024 12:42), Max: 37.7 (17 Dec 2024 00:24)  T(F): 98.8 (17 Dec 2024 12:42), Max: 99.8 (17 Dec 2024 00:24)  HR: 82 (17 Dec 2024 15:02) (76 - 95)  BP: 96/71 (17 Dec 2024 15:02) (96/71 - 123/79)  BP(mean): --  RR: 18 (17 Dec 2024 15:02) (18 - 18)  SpO2: 96% (17 Dec 2024 15:02) (96% - 99%)    Parameters below as of 17 Dec 2024 15:02  Patient On (Oxygen Delivery Method): room air        PHYSICAL EXAMINATION:    General: Well-developed, well nourished, in no acute distress.  Eyes: Conjunctiva and sclera clear.  Neck: supple, nontender, good ROM  Lungs: no respiratory distress  Neurologic:  - Mental Status:  Alert, awake, oriented to person, place, and time; Speech is fluent with intact naming, repetition, fluency.  - Cranial Nerves II-XII:  VFF, No nystagmus or APD noted, EOMI, PERRLA, V1-V3 intact, no facial asymmetry, t/p midline, SCM/trap intact.  - Motor:  Strength is 5/5 throughout.  There is no pronator drift.  Normal muscle bulk and tone throughout.  - Reflexes:  2+ and symmetric at the biceps, triceps, brachioradialis, knees, and ankles.  Plantar responses flexor.  - Sensory:  Intact to light touch, pin prick, vibration, and joint-position sense throughout.  - Coordination:  Finger-nose-finger and heel-knee-shin intact without dysmetria.  Rapid alternating hand movements intact.  - Gait:   Unable to test    --------------------------------------------------------------------------------------------------------------------------------------------------------------------------------------------------------------------------    LABS:                          14.5   5.29  )-----------( 195      ( 17 Dec 2024 06:40 )             42.6     12-17    137  |  99  |  12  ----------------------------<  85  3.6   |  23  |  0.97    Ca    9.2      17 Dec 2024 06:42  Phos  4.2     12-17  Mg     2.1     12-17    TPro  6.9  /  Alb  3.9  /  TBili  1.4[H]  /  DBili  x   /  AST  14  /  ALT  13  /  AlkPhos  65  12-17    PT/INR - ( 16 Dec 2024 06:13 )   PT: 11.3 sec;   INR: 0.98 ratio         PTT - ( 17 Dec 2024 06:41 )  PTT:26.8 sec      RADIOLOGY & ADDITIONAL STUDIES:    MR Head No Cont:  (16 Dec 2024 15:46)       *************************************  NEUROLOGY PROGRESS NOTE  **************************************    JUSTICE FRANK  Female  MRN-[id number]    Subjective: Patient seen and assessed with entire stroke team.  at bedside. When asked patient what brought her in, she had difficulty describing what happened. Aphasia was notably worst in this encounter due minor word errors such as headache instead of neck pain, eye pain instead of vision loss. She continues to have right blurry vision and right neck pain. Has some mild throat pain and feels unsteady on her feet but denies nausea, vomiting.     VITAL SIGNS:  Vital Signs Last 24 Hrs  T(C): 37.1 (17 Dec 2024 12:42), Max: 37.7 (17 Dec 2024 00:24)  T(F): 98.8 (17 Dec 2024 12:42), Max: 99.8 (17 Dec 2024 00:24)  HR: 82 (17 Dec 2024 15:02) (76 - 95)  BP: 96/71 (17 Dec 2024 15:02) (96/71 - 123/79)  BP(mean): --  RR: 18 (17 Dec 2024 15:02) (18 - 18)  SpO2: 96% (17 Dec 2024 15:02) (96% - 99%)    Parameters below as of 17 Dec 2024 15:02  Patient On (Oxygen Delivery Method): room air    PHYSICAL EXAMINATION:  General: Well-developed, well nourished, in no acute distress. Tired appearing  Eyes: Conjunctiva a clear.    Neurologic:  - Mental Status:  Alert, awake, oriented to person, place, and time; Speech is mildly dysfluent with paraphasic errors. Patient can repeat and identify items.   - Cranial Nerves II-XII:  VFF, No nystagmus or APD noted, EOMI, PERRLA, V1-V3 intact, no facial asymmetry, t/p midline.  - Motor:  Strength is 5/5 throughout.  There is no pronator drift.  Normal muscle bulk and tone throughout.  - Reflexes: not preformed due to focused neurological exam.  - Sensory:  Intact to light touch sense throughout.  - Coordination:  Finger-nose-finger without dysmetria.   - Gait: : not preformed due to focused neurological exam.    -------------------------------------------------------------------------------------------------------------------------------------------------------------------------------------------------------------------------  LABS:                          14.5   5.29  )-----------( 195      ( 17 Dec 2024 06:40 )             42.6     12-17    137  |  99  |  12  ----------------------------<  85  3.6   |  23  |  0.97    Ca    9.2      17 Dec 2024 06:42  Phos  4.2     12-17  Mg     2.1     12-17    TPro  6.9  /  Alb  3.9  /  TBili  1.4[H]  /  DBili  x   /  AST  14  /  ALT  13  /  AlkPhos  65  12-17    PT/INR - ( 16 Dec 2024 06:13 )   PT: 11.3 sec;   INR: 0.98 ratio    PTT - ( 17 Dec 2024 06:41 )  PTT:26.8 sec    RADIOLOGY & ADDITIONAL STUDIES:    MR Head No Cont:  (16 Dec 2024 15:46)  FINDINGS: A large area of diffusion restriction is seen within the left PCA territory affecting the left thalamus and left paramedian posteromedial temporal and left occipital lobes with associated hyperintense signal changes on the T2 FLAIR sequence, compatible with cytotoxic edema. There is no underlying gross hemorrhagic transformation. A focus of susceptibility artifact is seen within the left thalamus infarct bed which may represent a focus of petechial hemorrhagic transformation.  -Ventricular size and configuration is unremarkable. No abnormal extra axial fluid collections are visualized. Loss of the right V4 segment flow void is seen. Abrupt cutoff of the left proximal PCA branches are seen. Otherwise, the flow voids are noted throughout the major intracranial vessels, on the T2 weighted images, consistent with their patency. The sellar region appears unremarkable.  -The paranasal sinuses and tympanomastoid cavities are clear. The calvarium appears intact. There is unremarkable.  IMPRESSION: Evolving acute/subacute left-sided PCA distribution infarction with associated cytotoxic edema. Small rounded focus of petechial hemorrhagic transformation in the left thalamic infarction bed.    MRA H/N 12/16  NECK ARTERIAL CIRCULATION  The RIGHT CAROTID circulation demonstrates an intact common carotid artery. The carotid bulb appears intact. The internal carotid artery is patent to the skull base.  The LEFT CAROTID circulation demonstrates an intact common carotid artery. The carotid bulb appears intact. The internal carotid artery is patent to the skull base.  The dominant caliber left vertebral artery demonstrates near constant caliber from its origin to the foramen magnum. The right vertebral artery is not seen on the time-of-flight images. It demonstrates T1 hyperintensity on the fat-saturated images.    INTRACRANIAL ARTERIAL CIRCULATION  The ANTERIOR circulation demonstrates intact inflow from the ascending cervical segment to the petrous segment of each internal carotid artery. The cavernous and clinoid segments appear intact. The ophthalmic arteries are demonstrated as patent vessels on each side. The anterior cerebral arteries are patent and symmetric. An anterior communicating artery is present. The right middle cerebral artery demonstrates an intact initial M1 segment and patent peripheral anterior and posterior division sylvian branches. The left middle cerebral artery demonstrates an intact initial M1 segment and patent peripheral anterior and posterior division sylvian branches.    The POSTERIOR circulation demonstrates intact inflow from the dominant caliber left vertebral artery. There is flow within the most distal right vertebral artery that appears to be reversed from the basilar artery to its PICA. PICA arteries are patent on each side. The basilar artery appears intact. Superior cerebellar arteries are demonstrated. Posterior communicating artery is demonstrated on the right, not clearly seen on the left. The right posterior cerebral artery is patent to peripheral branching. The left posterior cerebral artery occludes distal to its P1 segment.    NOVA flow velocities (in milliliters per minute):  ANTERIOR circulation:  Right internal carotid artery: 329 * ^  Left internal carotid artery: 302 * ^  Right middle cerebral artery: 236 * ^  Left middle cerebral artery: 206 * ^  Total anterior cerebral arteries: 237 *  Right anterior cerebral artery A1 segment: 103 ^  Left anterior cerebral artery A1 segment: 134 ^  Right anterior cerebral artery A2 segment: 145 * ^  Left anterior cerebral artery A2 segment: 134 * ^  POSTERIOR circulation:  Total vertebral arteries: 192 *  Right vertebral artery: Not evaluated  Left vertebral artery: 192 ^  Basilar artery: 102 * ^  Right posterior cerebral artery: 96 * ^  Left posterior cerebral artery: 5 measured in its P1 segment is below age-matched controls ^  COMMUNICATING arteries:  Right: Not evaluated  Left: Not evaluated  * = These values are within the normal range for sample population age group: Age 41 to 60 years  ^ = Flow at these vessels is in the expected direction.  No intracranial aneurysm or arteriovenous malformation is recognized. Note that small intracranial aneurysms less than 0.5 cm may not be detected by this technique.    IMPRESSION:  1. RIGHT CAROTID NECK CIRCULATION: Intact.  2. LEFT CAROTID NECK CIRCULATION: Intact.  3. VERTEBRAL NECK CIRCULATION: Patent dominant caliber left vertebral artery. Occluded right vertebral artery. 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 ANTERIOR INTRACRANIAL CIRCULATION: Intact.  5. POSTERIOR INTRACRANIAL CIRCULATION: Occluded and flow right vertebral artery. Reversal of flow from the basilar artery to its patent PICA. Left posterior cerebral artery occludes at its distal P1 segment. Vertebral and right posterior cerebral arteries are intact.    CT Chest, Abdomen, Pelvis with contrast 12/16  CHEST:  LUNGS AND LARGE AIRWAYS: Patent central airways. No focal airspace   consolidations.  PLEURA: No pneumothorax or pleural effusion.  VESSELS: Within normal limits.  HEART: Heart size is normal. No pericardial effusion.  MEDIASTINUM AND RONA: There is pneumomediastinum tracking up into the   lower cervical neck. No lymphadenopathy.  CHEST WALL AND LOWER NECK: Subcutaneous emphysema described above.  ABDOMEN AND PELVIS:  LIVER: Subcentimeter hypoattenuation in the right hepatic dome too small   to characterize.  BILE DUCTS: Normal caliber.  GALLBLADDER: Within normal limits.  SPLEEN: Within normal limits.  PANCREAS: Within normal limits.  ADRENALS: Within normal limits.  KIDNEYS/URETERS: Within normal limits.  BLADDER: Within normal limits.  REPRODUCTIVE ORGANS: Retroverted and retroflexed uterus.  BOWEL: No bowel obstruction. Appendix is normal.  PERITONEUM/RETROPERITONEUM: Within normal limits.  VESSELS: Within normal limits.  LYMPH NODES: No lymphadenopathy.  ABDOMINAL WALL: Within normal limits.  BONES: Within normal limits.  IMPRESSION:  Pneumomediastinum tracking up the lower cervical neck of indeterminate etiology.  No evidence of malignancy in the chest, abdomen or pelvis      [facility] records:  CTH noncon - acute left occipital lobe infarct w/ mild associated edema.   No evidence of acute intracranial hemorrhage.   Submucosal air tracking along the deep neck soft tissue posterior to the pharynx, related to pneumomediastinum visualized on same day CTA nec  CT head brain perfusion: moderate perfusion defect in the left posterior cerebral artery territory  CTA head and neck: occlusion of the V2 segment of the right vertebral artery at C3-C4 may reflect acute thrombosis or dissection. Reconstitution at the V4 segment likely reflects retrograde filling. Occlusion of the P2 segment of the left posterior cerebral artery with diminished vascularity in the left inferior PCA territory. Partially visualized pneumomediastinum tracking to the neck within the deep neck soft tissue    TTE 12/16   1. Technically difficult image quality.   2. Left ventricular cavity is normal in size. Left ventricular systolic function is normal with an ejection fraction of 66 % by Dozier's method of disks. There are no regional wall motion abnormalities seen.   3. Normal right ventricular cavity size and normal right ventricular systolic function.   4. Non-diagnostic agitated saline injection.   5. No significant valvular disease.   6. No pericardial effusion seen.   7. No prior echocardiogram is available for comparison.       *************************************  NEUROLOGY PROGRESS NOTE  **************************************    JUSTICE FRANK  Female  MRN-[id number]    Subjective: Patient seen and assessed with entire stroke team.  at bedside. When asked patient what brought her in, she had difficulty describing what happened. Aphasia was notably worst in this encounter due minor word errors such as headache instead of neck pain, eye pain instead of vision loss. She continues to have right blurry vision and right neck pain. Has some mild throat pain and feels unsteady on her feet but denies nausea, vomiting.     VITAL SIGNS:  Vital Signs Last 24 Hrs  T(C): 37.1 (17 Dec 2024 12:42), Max: 37.7 (17 Dec 2024 00:24)  T(F): 98.8 (17 Dec 2024 12:42), Max: 99.8 (17 Dec 2024 00:24)  HR: 82 (17 Dec 2024 15:02) (76 - 95)  BP: 96/71 (17 Dec 2024 15:02) (96/71 - 123/79)  BP(mean): --  RR: 18 (17 Dec 2024 15:02) (18 - 18)  SpO2: 96% (17 Dec 2024 15:02) (96% - 99%)    Parameters below as of 17 Dec 2024 15:02  Patient On (Oxygen Delivery Method): room air    PHYSICAL EXAMINATION:  General: Well-developed, well nourished, in no acute distress. Tired appearing  Eyes: Conjunctiva a clear.    Neurologic:  - Mental Status:  Alert, awake, oriented to person, place, and time; Speech is very mildly dysfluent (occasional word-finding pauses) with occasional semantic paraphasic errors. Patient can repeat and name items.   - Cranial Nerves II-XII:  VFF, No nystagmus or APD noted, EOMI, PERRLA, V1-V3 intact, no facial asymmetry, t/p midline.  - Motor:  Strength is 5/5 throughout.  There is no pronator drift.  Normal muscle bulk and tone throughout.  - Reflexes: not preformed due to focused neurological exam.  - Sensory:  Intact to light touch sense throughout.  - Coordination:  Finger-nose-finger without dysmetria.   - Gait: : not preformed due to focused neurological exam.    -------------------------------------------------------------------------------------------------------------------------------------------------------------------------------------------------------------------------  LABS:                          14.5   5.29  )-----------( 195      ( 17 Dec 2024 06:40 )             42.6     12-17    137  |  99  |  12  ----------------------------<  85  3.6   |  23  |  0.97    Ca    9.2      17 Dec 2024 06:42  Phos  4.2     12-17  Mg     2.1     12-17    TPro  6.9  /  Alb  3.9  /  TBili  1.4[H]  /  DBili  x   /  AST  14  /  ALT  13  /  AlkPhos  65  12-17    PT/INR - ( 16 Dec 2024 06:13 )   PT: 11.3 sec;   INR: 0.98 ratio    PTT - ( 17 Dec 2024 06:41 )  PTT:26.8 sec    RADIOLOGY & ADDITIONAL STUDIES:    MR Head No Cont:  (16 Dec 2024 15:46)  FINDINGS: A large area of diffusion restriction is seen within the left PCA territory affecting the left thalamus and left paramedian posteromedial temporal and left occipital lobes with associated hyperintense signal changes on the T2 FLAIR sequence, compatible with cytotoxic edema. There is no underlying gross hemorrhagic transformation. A focus of susceptibility artifact is seen within the left thalamus infarct bed which may represent a focus of petechial hemorrhagic transformation.  -Ventricular size and configuration is unremarkable. No abnormal extra axial fluid collections are visualized. Loss of the right V4 segment flow void is seen. Abrupt cutoff of the left proximal PCA branches are seen. Otherwise, the flow voids are noted throughout the major intracranial vessels, on the T2 weighted images, consistent with their patency. The sellar region appears unremarkable.  -The paranasal sinuses and tympanomastoid cavities are clear. The calvarium appears intact. There is unremarkable.  IMPRESSION: Evolving acute/subacute left-sided PCA distribution infarction with associated cytotoxic edema. Small rounded focus of petechial hemorrhagic transformation in the left thalamic infarction bed.    MRA H/N 12/16  NECK ARTERIAL CIRCULATION  The RIGHT CAROTID circulation demonstrates an intact common carotid artery. The carotid bulb appears intact. The internal carotid artery is patent to the skull base.  The LEFT CAROTID circulation demonstrates an intact common carotid artery. The carotid bulb appears intact. The internal carotid artery is patent to the skull base.  The dominant caliber left vertebral artery demonstrates near constant caliber from its origin to the foramen magnum. The right vertebral artery is not seen on the time-of-flight images. It demonstrates T1 hyperintensity on the fat-saturated images.    INTRACRANIAL ARTERIAL CIRCULATION  The ANTERIOR circulation demonstrates intact inflow from the ascending cervical segment to the petrous segment of each internal carotid artery. The cavernous and clinoid segments appear intact. The ophthalmic arteries are demonstrated as patent vessels on each side. The anterior cerebral arteries are patent and symmetric. An anterior communicating artery is present. The right middle cerebral artery demonstrates an intact initial M1 segment and patent peripheral anterior and posterior division sylvian branches. The left middle cerebral artery demonstrates an intact initial M1 segment and patent peripheral anterior and posterior division sylvian branches.    The POSTERIOR circulation demonstrates intact inflow from the dominant caliber left vertebral artery. There is flow within the most distal right vertebral artery that appears to be reversed from the basilar artery to its PICA. PICA arteries are patent on each side. The basilar artery appears intact. Superior cerebellar arteries are demonstrated. Posterior communicating artery is demonstrated on the right, not clearly seen on the left. The right posterior cerebral artery is patent to peripheral branching. The left posterior cerebral artery occludes distal to its P1 segment.    NOVA flow velocities (in milliliters per minute):  ANTERIOR circulation:  Right internal carotid artery: 329 * ^  Left internal carotid artery: 302 * ^  Right middle cerebral artery: 236 * ^  Left middle cerebral artery: 206 * ^  Total anterior cerebral arteries: 237 *  Right anterior cerebral artery A1 segment: 103 ^  Left anterior cerebral artery A1 segment: 134 ^  Right anterior cerebral artery A2 segment: 145 * ^  Left anterior cerebral artery A2 segment: 134 * ^  POSTERIOR circulation:  Total vertebral arteries: 192 *  Right vertebral artery: Not evaluated  Left vertebral artery: 192 ^  Basilar artery: 102 * ^  Right posterior cerebral artery: 96 * ^  Left posterior cerebral artery: 5 measured in its P1 segment is below age-matched controls ^  COMMUNICATING arteries:  Right: Not evaluated  Left: Not evaluated  * = These values are within the normal range for sample population age group: Age 41 to 60 years  ^ = Flow at these vessels is in the expected direction.  No intracranial aneurysm or arteriovenous malformation is recognized. Note that small intracranial aneurysms less than 0.5 cm may not be detected by this technique.    IMPRESSION:  1. RIGHT CAROTID NECK CIRCULATION: Intact.  2. LEFT CAROTID NECK CIRCULATION: Intact.  3. VERTEBRAL NECK CIRCULATION: Patent dominant caliber left vertebral artery. Occluded right vertebral artery. 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 ANTERIOR INTRACRANIAL CIRCULATION: Intact.  5. POSTERIOR INTRACRANIAL CIRCULATION: Occluded and flow right vertebral artery. Reversal of flow from the basilar artery to its patent PICA. Left posterior cerebral artery occludes at its distal P1 segment. Vertebral and right posterior cerebral arteries are intact.    CT Chest, Abdomen, Pelvis with contrast 12/16  CHEST:  LUNGS AND LARGE AIRWAYS: Patent central airways. No focal airspace   consolidations.  PLEURA: No pneumothorax or pleural effusion.  VESSELS: Within normal limits.  HEART: Heart size is normal. No pericardial effusion.  MEDIASTINUM AND RONA: There is pneumomediastinum tracking up into the   lower cervical neck. No lymphadenopathy.  CHEST WALL AND LOWER NECK: Subcutaneous emphysema described above.  ABDOMEN AND PELVIS:  LIVER: Subcentimeter hypoattenuation in the right hepatic dome too small   to characterize.  BILE DUCTS: Normal caliber.  GALLBLADDER: Within normal limits.  SPLEEN: Within normal limits.  PANCREAS: Within normal limits.  ADRENALS: Within normal limits.  KIDNEYS/URETERS: Within normal limits.  BLADDER: Within normal limits.  REPRODUCTIVE ORGANS: Retroverted and retroflexed uterus.  BOWEL: No bowel obstruction. Appendix is normal.  PERITONEUM/RETROPERITONEUM: Within normal limits.  VESSELS: Within normal limits.  LYMPH NODES: No lymphadenopathy.  ABDOMINAL WALL: Within normal limits.  BONES: Within normal limits.  IMPRESSION:  Pneumomediastinum tracking up the lower cervical neck of indeterminate etiology.  No evidence of malignancy in the chest, abdomen or pelvis      [facility] records:  CTH noncon - acute left occipital lobe infarct w/ mild associated edema.   No evidence of acute intracranial hemorrhage.   Submucosal air tracking along the deep neck soft tissue posterior to the pharynx, related to pneumomediastinum visualized on same day CTA nec  CT head brain perfusion: moderate perfusion defect in the left posterior cerebral artery territory  CTA head and neck: occlusion of the V2 segment of the right vertebral artery at C3-C4 may reflect acute thrombosis or dissection. Reconstitution at the V4 segment likely reflects retrograde filling. Occlusion of the P2 segment of the left posterior cerebral artery with diminished vascularity in the left inferior PCA territory. Partially visualized pneumomediastinum tracking to the neck within the deep neck soft tissue    TTE 12/16   1. Technically difficult image quality.   2. Left ventricular cavity is normal in size. Left ventricular systolic function is normal with an ejection fraction of 66 % by Dozier's method of disks. There are no regional wall motion abnormalities seen.   3. Normal right ventricular cavity size and normal right ventricular systolic function.   4. Non-diagnostic agitated saline injection.   5. No significant valvular disease.   6. No pericardial effusion seen.   7. No prior echocardiogram is available for comparison.

## 2024-12-17 NOTE — PROGRESS NOTE ADULT - ASSESSMENT
39F no pmhx presents as a transfer for [facility] for stroke workup. On 12/13/24, patient developed "stomach bug", had nausea, vomiting, and diarrhea. States she vomiting about 10 times, had retching. Denies blood in stool or vomit. She went to sleep 12/13/24 around 11pm, which was her last known well. She woke up 12/14/24 AM w/ RIGHT arm and leg numbness and RIGHT arm and leg weakness. She went to [facility] for evaluation, where they found a LEFT occipital stroke at [facility]. On CT scans, patient found to have pneumomediastinum. Thoracic surgery consulted.       39F no pmhx presents as a transfer for [facility] for stroke workup. On 12/13/24, patient developed "stomach bug", had nausea, vomiting, and diarrhea. States she vomiting about 10 times, had retching. Denies blood in stool or vomit. She went to sleep 12/13/24 around 11pm, which was her last known well. She woke up 12/14/24 AM w/ RIGHT arm and leg numbness and RIGHT arm and leg weakness. She went to [facility] for evaluation, where they found a LEFT occipital stroke at [facility]. On CT scans, patient found to have pneumomediastinum. Thoracic surgery consulted.   12/17 Esophagram completed> await read

## 2024-12-17 NOTE — PROGRESS NOTE ADULT - ASSESSMENT
ASSESSMENT:     NEURO: Continue close monitoring for neurologic deterioration, permissive HTN, titrate statin to LDL goal less than 70, MRI Brain w/o, MRA Head w/o and Neck w/contrast. Physical therapy/OT/Speech eval/treatment.     ANTITHROMBOTIC THERAPY:     PULMONARY: CXR clear, protecting airway, saturating well     CARDIOVASCULAR: check TTE, cardiac monitoring                              SBP goal:     GASTROINTESTINAL:  dysphagia screen       Diet:     RENAL: BUN/Cr stable, good urine output      Na Goal: Greater than 135     Arshad:    HEMATOLOGY: H/H stable, Platelets normal      DVT ppx: Heparin s.c [] LMWH []     ID: afebrile, no leukocytosis     OTHER:     DISPOSITION: Rehab or home depending on PT eval once stable and workup is complete      CORE MEASURES:        Admission NIHSS:      TPA: [] YES [] NO      LDL/HDL:     Depression Screen:      Statin Therapy:     Dysphagia Screen: [] PASS [] FAIL     Smoking [] YES [] NO      Afib [] YES [] NO     Stroke Education [] YES [] NO ASSESSMENT: 39 year old right handed female with PMHx PMDD who initially presented to [facility] for changes in speech, vision, and right sided numbness/weakness. Of note patient had a recent gastroenteritis with severe vomiting. Patient found to have an acute left occipital lobe infarct on CTH.  LKW on 12/13/2024 at around 23:00. mRS 0. NIHSS was 2 for visual field defect in the temporal aspect of the R eye and mild RUE drift. CTH w/o contrast at [facility] revealed acute left occipital lobe infarct with mild associated edema and no ICH. CT perfusion revealed moderate perfusion defect in the left posterior cerebral artery. CTA H/N revealed occlusion of the V2 segment of the right vertebral artery, occlusion of the P2 segment of the left posterior cerebral artery. Tenecteplase was not administered as patient presented outside of window. Mechanical thrombectomy was not performed due to low NIHSS and distal occlusion. Patient was transferred to [facility] for further evaluation. Patient continues to show a right superior quadrantopia, and aphasia. Left sided numbness and weakness has resolved. MRI shows large area of diffusion restriction is seen within the left PCA territory affecting the left thalamus and left paramedian posteromedial temporal and left occipital lobes. MRA H/N shows occlusion of right vertebral artery & T1 hyperintensity consistent with arterial dissection. AP/AC currently held due to possible esophageal rupture, pending gastrographin esophagram result, thoracic surgery following.     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     NEURO:   [] f/u repeat CTH due to fluctuation in aphasia/speech on exam    [x] Stroke in the young workup:      -CT Chest/Abdomen/Pelvis- negative for malignancy  [x] MRI brain without contrast  [x] MRA head & neck (T1 fat sat)`  [x] HbA1C 5.5 and Ldl (171)  [x] Atorvastatin 80MG QHS, titrate to LDL<70  [x] TTE EF= 66%  [] Telemonitoring; Neurochecks and vital signs Q4H  [] Gradual normotension (BP <130/80)    ANTITHROMBOTIC THERAPY: AP/AC currently held due to possible esophageal rupture, pending gastrographin esophagram result, thoracic surgery following  Plan to start AC after esophagram, once cleared    PULMONARY: CXR clear, protecting airway, saturating well   CT CAP w/ contrast (12/16)  Pneumomediastinum tracking up the lower cervical neck    CARDIOVASCULAR:    [x] TTE EF= 66%  (BP <130/80)                   SBP goal: normotension    GASTROINTESTINAL:       Diet: NPO awaiting Gastrographin esophagram result    RENAL: BUN/Cr stable, good urine output   Na 137  gentle fluids 100mL/hr     HEMATOLOGY: H/H stable, Platelets normal      DVT ppx: Heparin s.c [] LMWH [x]     ID: afebrile, no leukocytosis     DISPOSITION: home with outpatient PT/OT     CORE MEASURES:        Admission NIHSS: 2     TPA: [] YES [x] NO      LDL/HDL:      Statin Therapy: atorvastatin 80mg daily      Dysphagia Screen: [x] PASS [] FAIL     Smoking [] YES [x] NO      Afib [] YES [x] NO     Stroke Education [x] YES [] NO    Case seen and discussed with Neurology Attending, Dr. Libman  ASSESSMENT: 39 year old right handed female with PMHx PMDD who initially presented to [facility] for changes in speech, vision, and right sided numbness/weakness. Of note patient had a recent gastroenteritis with severe vomiting. LKW on 12/13/2024 at around 23:00. mRS 0. NIHSS was 2 for visual field defect in the temporal aspect of the R eye and mild RUE drift. CTH w/o contrast at [facility] revealed acute left occipital lobe infarct with mild associated edema and no ICH. CT perfusion revealed moderate perfusion defect in the left posterior cerebral artery. CTA H/N revealed occlusion of the V2 segment of the right vertebral artery, occlusion of the P2 segment of the left posterior cerebral artery. Tenecteplase was not administered as patient presented outside of window. Mechanical thrombectomy was not performed due to low NIHSS and distal occlusion. Patient was transferred to [facility] for further evaluation. Patient continues to show a right superior quadrantopia, and aphasia on exam. Left sided numbness and weakness has resolved. MRI shows large area of diffusion restriction is seen within the left PCA territory affecting the left thalamus and left paramedian posteromedial temporal and left occipital lobes. MRA H/N shows occlusion of right vertebral artery & T1 hyperintensity consistent with arterial dissection. AP/AC currently held due to possible esophageal rupture likely related to vomiting. Pending gastrographin esophagram result, thoracic surgery following.     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     NEURO:   [] f/u repeat CTH due to fluctuation in aphasia/speech on exam    [x] Stroke in the young workup:      -CT Chest/Abdomen/Pelvis- negative for malignancy  [x] MRI brain without contrast  [x] MRA head & neck (T1 fat sat)`  [x] HbA1C 5.5 and Ldl (171)  [x] Atorvastatin 80MG QHS, titrate to LDL<70  [x] TTE EF= 66%  [] Telemonitoring; Neurochecks and vital signs Q4H  [] Gradual normotension (BP <130/80)    ANTITHROMBOTIC THERAPY: AP/AC currently held due to possible esophageal rupture, pending gastrographin esophagram result, thoracic surgery following  Plan to start AC after esophagram, once cleared    PULMONARY: CXR clear, protecting airway, saturating well   CT CAP w/ contrast (12/16)  Pneumomediastinum tracking up the lower cervical neck    CARDIOVASCULAR:    [x] TTE EF= 66%  (BP <130/80)                   SBP goal: normotension    GASTROINTESTINAL:       Diet: NPO awaiting Gastrographin esophagram result    RENAL: BUN/Cr stable, good urine output   Na 137  gentle fluids 100mL/hr     HEMATOLOGY: H/H stable, Platelets normal      DVT ppx: Heparin s.c [] LMWH [x]     ID: afebrile, no leukocytosis     DISPOSITION: home with outpatient PT/OT     CORE MEASURES:        Admission NIHSS: 2     TPA: [] YES [x] NO      LDL/HDL:      Statin Therapy: atorvastatin 80mg daily      Dysphagia Screen: [x] PASS [] FAIL     Smoking [] YES [x] NO      Afib [] YES [x] NO     Stroke Education [x] YES [] NO    Case seen and discussed with Neurology Attending, Dr. Libman  ASSESSMENT: 39 year old right handed female with PMHx PMDD who initially presented to [facility] for changes in speech, vision, and right sided numbness/weakness. Of note patient had a recent gastroenteritis with severe vomiting. LKW on 12/13/2024 at around 23:00. mRS 0. NIHSS was 2 for visual field defect in the temporal aspect of the R eye and mild RUE drift. CTH w/o contrast at [facility] revealed acute left occipital lobe infarct with mild associated edema and no ICH. CT perfusion revealed moderate perfusion defect in the left posterior cerebral artery. CTA H/N revealed occlusion of the V2 segment of the right vertebral artery, occlusion of the P2 segment of the left posterior cerebral artery. Tenecteplase was not administered as patient presented outside of window. Mechanical thrombectomy was not performed due to low NIHSS and distal occlusion. Patient was transferred to [facility] for further evaluation. Patient continues to show a right superior quadrantopia, and aphasia on exam. Right sided numbness and weakness has resolved. MRI shows large area of diffusion restriction is seen within the left PCA territory affecting the left thalamus and left posteromedial temporal and left occipital lobes. MRA H/N shows occlusion of right vertebral artery & T1 hyperintensity consistent with arterial dissection. AP/AC currently held due to possible esophageal rupture likely related to vomiting. Pending gastrographin esophagram result, thoracic surgery following.     Impression: slightly worsening transcortical sensory aphasia and resolved right sensorimotor deficits due to left PCA acute ischemic stroke 2/2 left PCA occlusion (P2 segment) likely due to right vertebral dissection & arterio-arterial embolus. The slight worsening of her aphasia may be due to edema surrounding her infarction; doubt recurrent infarction or significant hemorrhage but this can be screened for.    NEURO:   [] f/u repeat CTH due to fluctuation in aphasia/speech on exam    [x] Stroke in the young workup:      -CT Chest/Abdomen/Pelvis- negative for malignancy  [x] MRI brain without contrast  [x] MRA head & neck (T1 fat sat)`  [x] HbA1C 5.5 and Ldl (171)  [x] Atorvastatin only as per 10 year ASCVD risk calculation  [x] TTE EF= 66%  [] Telemonitoring; Neurochecks and vital signs Q4H  [] Gradual normotension (BP <130/80)    ANTITHROMBOTIC THERAPY: AP/AC currently held due to possible esophageal rupture, pending gastrographin esophagram result, thoracic surgery following  Plan to start AC after esophagram, once cleared    PULMONARY: CXR clear, protecting airway, saturating well   CT CAP w/ contrast (12/16)  Pneumomediastinum tracking up the lower cervical neck    CARDIOVASCULAR:    [x] TTE EF= 66%  (BP <130/80)                   SBP goal: normotension    GASTROINTESTINAL:       Diet: NPO awaiting Gastrographin esophagram result    RENAL: BUN/Cr stable, good urine output   Na 137  gentle fluids 100mL/hr     HEMATOLOGY: H/H stable, Platelets normal      DVT ppx: Heparin s.c [] LMWH [x]     ID: afebrile, no leukocytosis     DISPOSITION: home with outpatient PT/OT     CORE MEASURES:        Admission NIHSS: 2     TPA: [] YES [x] NO      LDL/HDL:      Statin Therapy: atorvastatin 80mg daily      Dysphagia Screen: [x] PASS [] FAIL     Smoking [] YES [x] NO      Afib [] YES [x] NO     Stroke Education [x] YES [] NO    Case seen and discussed with Neurology Attending, Dr. Libman

## 2024-12-17 NOTE — PROGRESS NOTE ADULT - TIME BILLING
I was present with the Resident during the key portions of the history and exam. I discussed the case with the Resident and agree with the findings and plan as documented in the Resident's note, unless noted below.   ROS otherwise negative

## 2024-12-18 ENCOUNTER — TRANSCRIPTION ENCOUNTER (OUTPATIENT)
Age: 39
End: 2024-12-18

## 2024-12-18 VITALS
SYSTOLIC BLOOD PRESSURE: 109 MMHG | TEMPERATURE: 98 F | HEART RATE: 81 BPM | DIASTOLIC BLOOD PRESSURE: 77 MMHG | OXYGEN SATURATION: 95 % | RESPIRATION RATE: 18 BRPM

## 2024-12-18 LAB
ALBUMIN SERPL ELPH-MCNC: 3.3 G/DL — SIGNIFICANT CHANGE UP (ref 3.3–5)
ALP SERPL-CCNC: 62 U/L — SIGNIFICANT CHANGE UP (ref 40–120)
ALT FLD-CCNC: 11 U/L — SIGNIFICANT CHANGE UP (ref 10–45)
ANION GAP SERPL CALC-SCNC: 14 MMOL/L — SIGNIFICANT CHANGE UP (ref 5–17)
AST SERPL-CCNC: 14 U/L — SIGNIFICANT CHANGE UP (ref 10–40)
BILIRUB SERPL-MCNC: 1 MG/DL — SIGNIFICANT CHANGE UP (ref 0.2–1.2)
BUN SERPL-MCNC: 13 MG/DL — SIGNIFICANT CHANGE UP (ref 7–23)
CALCIUM SERPL-MCNC: 8.7 MG/DL — SIGNIFICANT CHANGE UP (ref 8.4–10.5)
CHLORIDE SERPL-SCNC: 104 MMOL/L — SIGNIFICANT CHANGE UP (ref 96–108)
CO2 SERPL-SCNC: 20 MMOL/L — LOW (ref 22–31)
CREAT SERPL-MCNC: 0.75 MG/DL — SIGNIFICANT CHANGE UP (ref 0.5–1.3)
EGFR: 103 ML/MIN/1.73M2 — SIGNIFICANT CHANGE UP
GLUCOSE SERPL-MCNC: 98 MG/DL — SIGNIFICANT CHANGE UP (ref 70–99)
HCT VFR BLD CALC: 38.8 % — SIGNIFICANT CHANGE UP (ref 34.5–45)
HGB BLD-MCNC: 13.4 G/DL — SIGNIFICANT CHANGE UP (ref 11.5–15.5)
MAGNESIUM SERPL-MCNC: 2.2 MG/DL — SIGNIFICANT CHANGE UP (ref 1.6–2.6)
MCHC RBC-ENTMCNC: 28.9 PG — SIGNIFICANT CHANGE UP (ref 27–34)
MCHC RBC-ENTMCNC: 34.5 G/DL — SIGNIFICANT CHANGE UP (ref 32–36)
MCV RBC AUTO: 83.8 FL — SIGNIFICANT CHANGE UP (ref 80–100)
NRBC # BLD: 0 /100 WBCS — SIGNIFICANT CHANGE UP (ref 0–0)
PHOSPHATE SERPL-MCNC: 3.8 MG/DL — SIGNIFICANT CHANGE UP (ref 2.5–4.5)
PLATELET # BLD AUTO: 196 K/UL — SIGNIFICANT CHANGE UP (ref 150–400)
POTASSIUM SERPL-MCNC: 3.9 MMOL/L — SIGNIFICANT CHANGE UP (ref 3.5–5.3)
POTASSIUM SERPL-SCNC: 3.9 MMOL/L — SIGNIFICANT CHANGE UP (ref 3.5–5.3)
PROT SERPL-MCNC: 6.2 G/DL — SIGNIFICANT CHANGE UP (ref 6–8.3)
RBC # BLD: 4.63 M/UL — SIGNIFICANT CHANGE UP (ref 3.8–5.2)
RBC # FLD: 12.4 % — SIGNIFICANT CHANGE UP (ref 10.3–14.5)
SODIUM SERPL-SCNC: 138 MMOL/L — SIGNIFICANT CHANGE UP (ref 135–145)
WBC # BLD: 5.51 K/UL — SIGNIFICANT CHANGE UP (ref 3.8–10.5)
WBC # FLD AUTO: 5.51 K/UL — SIGNIFICANT CHANGE UP (ref 3.8–10.5)

## 2024-12-18 PROCEDURE — 92523 SPEECH SOUND LANG COMPREHEN: CPT

## 2024-12-18 PROCEDURE — 97129 THER IVNTJ 1ST 15 MIN: CPT

## 2024-12-18 PROCEDURE — 85027 COMPLETE CBC AUTOMATED: CPT

## 2024-12-18 PROCEDURE — 86900 BLOOD TYPING SEROLOGIC ABO: CPT

## 2024-12-18 PROCEDURE — 71045 X-RAY EXAM CHEST 1 VIEW: CPT

## 2024-12-18 PROCEDURE — 36415 COLL VENOUS BLD VENIPUNCTURE: CPT

## 2024-12-18 PROCEDURE — 97116 GAIT TRAINING THERAPY: CPT

## 2024-12-18 PROCEDURE — 93356 MYOCRD STRAIN IMG SPCKL TRCK: CPT

## 2024-12-18 PROCEDURE — 93005 ELECTROCARDIOGRAM TRACING: CPT

## 2024-12-18 PROCEDURE — 71260 CT THORAX DX C+: CPT | Mod: MC

## 2024-12-18 PROCEDURE — 84702 CHORIONIC GONADOTROPIN TEST: CPT

## 2024-12-18 PROCEDURE — 70547 MR ANGIOGRAPHY NECK W/O DYE: CPT | Mod: MC

## 2024-12-18 PROCEDURE — 97162 PT EVAL MOD COMPLEX 30 MIN: CPT

## 2024-12-18 PROCEDURE — 85613 RUSSELL VIPER VENOM DILUTED: CPT

## 2024-12-18 PROCEDURE — 97110 THERAPEUTIC EXERCISES: CPT

## 2024-12-18 PROCEDURE — 74177 CT ABD & PELVIS W/CONTRAST: CPT | Mod: MC

## 2024-12-18 PROCEDURE — 85025 COMPLETE CBC W/AUTO DIFF WBC: CPT

## 2024-12-18 PROCEDURE — 86850 RBC ANTIBODY SCREEN: CPT

## 2024-12-18 PROCEDURE — 80061 LIPID PANEL: CPT

## 2024-12-18 PROCEDURE — 93306 TTE W/DOPPLER COMPLETE: CPT

## 2024-12-18 PROCEDURE — 97166 OT EVAL MOD COMPLEX 45 MIN: CPT

## 2024-12-18 PROCEDURE — 85730 THROMBOPLASTIN TIME PARTIAL: CPT

## 2024-12-18 PROCEDURE — 70551 MRI BRAIN STEM W/O DYE: CPT | Mod: MC

## 2024-12-18 PROCEDURE — 86901 BLOOD TYPING SEROLOGIC RH(D): CPT

## 2024-12-18 PROCEDURE — 70544 MR ANGIOGRAPHY HEAD W/O DYE: CPT | Mod: MC

## 2024-12-18 PROCEDURE — 85610 PROTHROMBIN TIME: CPT

## 2024-12-18 PROCEDURE — 70450 CT HEAD/BRAIN W/O DYE: CPT | Mod: MC

## 2024-12-18 PROCEDURE — 99232 SBSQ HOSP IP/OBS MODERATE 35: CPT

## 2024-12-18 PROCEDURE — 83735 ASSAY OF MAGNESIUM: CPT

## 2024-12-18 PROCEDURE — 71045 X-RAY EXAM CHEST 1 VIEW: CPT | Mod: 26

## 2024-12-18 PROCEDURE — 99233 SBSQ HOSP IP/OBS HIGH 50: CPT

## 2024-12-18 PROCEDURE — 84100 ASSAY OF PHOSPHORUS: CPT

## 2024-12-18 PROCEDURE — 74220 X-RAY XM ESOPHAGUS 1CNTRST: CPT

## 2024-12-18 PROCEDURE — 86147 CARDIOLIPIN ANTIBODY EA IG: CPT

## 2024-12-18 PROCEDURE — 86146 BETA-2 GLYCOPROTEIN ANTIBODY: CPT

## 2024-12-18 PROCEDURE — 97530 THERAPEUTIC ACTIVITIES: CPT

## 2024-12-18 PROCEDURE — 80053 COMPREHEN METABOLIC PANEL: CPT

## 2024-12-18 PROCEDURE — 83036 HEMOGLOBIN GLYCOSYLATED A1C: CPT

## 2024-12-18 RX ORDER — APIXABAN 2.5 MG/1
5 TABLET, FILM COATED ORAL EVERY 12 HOURS
Refills: 0 | Status: DISCONTINUED | OUTPATIENT
Start: 2024-12-18 | End: 2024-12-18

## 2024-12-18 RX ORDER — APIXABAN 2.5 MG/1
1 TABLET, FILM COATED ORAL
Qty: 0 | Refills: 0 | DISCHARGE
Start: 2024-12-18

## 2024-12-18 RX ORDER — APIXABAN 2.5 MG/1
1 TABLET, FILM COATED ORAL
Qty: 180 | Refills: 0
Start: 2024-12-18 | End: 2025-03-17

## 2024-12-18 RX ORDER — SODIUM CHLORIDE 9 MG/ML
1000 INJECTION, SOLUTION INTRAMUSCULAR; INTRAVENOUS; SUBCUTANEOUS
Refills: 0 | Status: DISCONTINUED | OUTPATIENT
Start: 2024-12-18 | End: 2024-12-18

## 2024-12-18 RX ADMIN — PANTOPRAZOLE SODIUM 40 MILLIGRAM(S): 40 TABLET, DELAYED RELEASE ORAL at 05:37

## 2024-12-18 RX ADMIN — ACETAMINOPHEN 500MG 650 MILLIGRAM(S): 500 TABLET, COATED ORAL at 14:00

## 2024-12-18 RX ADMIN — ENOXAPARIN SODIUM 40 MILLIGRAM(S): 30 INJECTION SUBCUTANEOUS at 05:37

## 2024-12-18 RX ADMIN — ACETAMINOPHEN 500MG 650 MILLIGRAM(S): 500 TABLET, COATED ORAL at 13:46

## 2024-12-18 NOTE — PROGRESS NOTE ADULT - PROBLEM SELECTOR PLAN 1
- Patient currently asymptomatic   - Thoracic surgery to sign off - Patient currently asymptomatic   Regular diet  - Thoracic surgery to sign off

## 2024-12-18 NOTE — PROGRESS NOTE ADULT - PROVIDER SPECIALTY LIST ADULT
----- Message from Sravanthi Oliveros DO sent at 11/2/2021  8:53 AM CDT -----  Renal function is markedly worse.   Potassium is elevated  Repeat cmp stat  Stop all NSAID use.   Blood sugar remains in controlled range.   
Neurology
Neurology
Thoracic Surgery
Thoracic Surgery
Internal Medicine

## 2024-12-18 NOTE — PROGRESS NOTE ADULT - ASSESSMENT
39 year old right handed female with PMHx PMDD who initially presented to [facility] for changes in speech, vision, and right sided numbness/weakness. Of note patient had a recent gastroenteritis with severe vomiting. LKW on 12/13/2024 at around 23:00. mRS 0. NIHSS was 2 for visual field defect in the temporal aspect of the R eye and mild RUE drift. CTH w/o contrast at [facility] revealed acute left occipital lobe infarct with mild associated edema and no ICH. CT perfusion revealed moderate perfusion defect in the left posterior cerebral artery. CTA H/N revealed occlusion of the V2 segment of the right vertebral artery, occlusion of the P2 segment of the left posterior cerebral artery. Tenecteplase was not administered as patient presented outside of window. Mechanical thrombectomy was not performed due to low NIHSS and distal occlusion. Patient was transferred to [facility] for further evaluation      Impression: transcortical sensory aphasia and resolved right sensorimotor deficits due to left PCA acute ischemic stroke 2/2 left PCA occlusion (P2 segment) likely due to right vertebral dissection & arterio-arterial embolus.    NEURO: Continue close monitoring for neurologic deterioration, permissive HTN, titrate statin to LDL goal less than 70, MRI Brain w/o, MRA Head w/o and Neck w/contrast. Physical therapy/OT/Speech eval/treatment.     ANTITHROMBOTIC THERAPY:     PULMONARY: CXR clear, protecting airway, saturating well     CARDIOVASCULAR: check TTE, cardiac monitoring                              SBP goal:     GASTROINTESTINAL:  dysphagia screen       Diet:     RENAL: BUN/Cr stable, good urine output      Na Goal: Greater than 135     Arshad:    HEMATOLOGY: H/H stable, Platelets normal      DVT ppx: Heparin s.c [] LMWH []     ID: afebrile, no leukocytosis     OTHER:     DISPOSITION: Rehab or home depending on PT eval once stable and workup is complete      CORE MEASURES:        Admission NIHSS:      TPA: [] YES [] NO      LDL/HDL:     Depression Screen:      Statin Therapy:     Dysphagia Screen: [] PASS [] FAIL     Smoking [] YES [] NO      Afib [] YES [] NO     Stroke Education [] YES [] NO    Obtain screening lower extremity venous ultrasound in patients who meet 1 or more of the following criteria as patient is high risk for DVT/PE on admission:   [] History of DVT/PE  []Hypercoagulable states (Factor V Leiden, Cancer, OCP, etc. )  []Prolonged immobility (hemiplegia/hemiparesis/post operative or any other extended immobilization)  [] Transferred from outside facility (Rehab or Long term care)  [] Age </= to 50. 39 year old right handed female with PMHx PMDD who initially presented to [facility] for changes in speech, vision, and right sided numbness/weakness. Of note patient had a recent gastroenteritis with severe vomiting. LKW on 12/13/2024 at around 23:00. mRS 0. NIHSS was 2 for visual field defect in the temporal aspect of the R eye and mild RUE drift. CTH w/o contrast at [facility] revealed acute left occipital lobe infarct with mild associated edema and no ICH. CT perfusion revealed moderate perfusion defect in the left posterior cerebral artery. CTA H/N revealed occlusion of the V2 segment of the right vertebral artery, occlusion of the P2 segment of the left posterior cerebral artery. Tenecteplase was not administered as patient presented outside of window. Mechanical thrombectomy was not performed due to low NIHSS and distal occlusion. 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    NEURO: Continue close monitoring for neurologic deterioration, permissive HTN, titrate statin to LDL goal less than 70, MRI Brain w/o, MRA Head w/o and Neck w/contrast. Physical therapy/OT/Speech eval/treatment.     ANTITHROMBOTIC THERAPY:     PULMONARY: CXR clear, protecting airway, saturating well     CARDIOVASCULAR: check TTE, cardiac monitoring                              SBP goal:     GASTROINTESTINAL:  dysphagia screen       Diet:     RENAL: BUN/Cr stable, good urine output      Na Goal: Greater than 135     Arshad: N    HEMATOLOGY: H/H stable, Platelets normal, CT C/A/P: Pneumomediastinum tracking up the lower cervical neck of indeterminate   etiology. No evidence of malignancy in the chest, abdomen or pelvis. Thoracic surgery consulted for Pneumomediastinum: S/P gastrograffin esophagram   : No fluoroscopic evidence of esophageal rupture.      DVT ppx: Heparin s.c [] LMWH []     ID: afebrile, no leukocytosis     OTHER:     DISPOSITION: Rehab or home depending on PT eval once stable and workup is complete      CORE MEASURES:        Admission NIHSS:      TPA: [] YES [] NO      LDL/HDL:     Depression Screen:      Statin Therapy:     Dysphagia Screen: [] PASS [] FAIL     Smoking [] YES [] NO      Afib [] YES [] NO     Stroke Education [] YES [] NO    Obtain screening lower extremity venous ultrasound in patients who meet 1 or more of the following criteria as patient is high risk for DVT/PE on admission:   [] History of DVT/PE  []Hypercoagulable states (Factor V Leiden, Cancer, OCP, etc. )  []Prolonged immobility (hemiplegia/hemiparesis/post operative or any other extended immobilization)  [] Transferred from outside facility (Rehab or Long term care)  [] Age </= to 50. 39 year old right handed female with PMHx PMDD who initially presented to [facility] for changes in speech, vision, and right sided numbness/weakness. Of note patient had a recent gastroenteritis with severe vomiting. LKW on 12/13/2024 at around 23:00. mRS 0. NIHSS was 2 for visual field defect in the temporal aspect of the R eye and mild RUE drift. CTH w/o contrast at [facility] revealed acute left occipital lobe infarct with mild associated edema and no ICH. CT perfusion revealed moderate perfusion defect in the left posterior cerebral artery. CTA H/N revealed occlusion of the V2 segment of the right vertebral artery, occlusion of the P2 segment of the left posterior cerebral artery. Tenecteplase was not administered as patient presented outside of window. Mechanical thrombectomy was not performed due to low NIHSS and distal occlusion. 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    NEURO: Improved since admission, Continue close monitoring for neurologic deterioration, permissive HTN, : high dose statin MRI Brain w/o, MRA Head w/o and Neck w/contrast noted above. Physical therapy/OT: home with outpatient therapy    ANTITHROMBOTIC THERAPY: Plan for Eliquis 5 mg BID for 3 months, cleared by thoracic surgery prior to starting    PULMONARY: CXR clear 12/16, protecting airway, saturating well     CARDIOVASCULAR:  TTE:  Left ventricular cavity is normal in size. Left ventricular systolic function is normal with an ejection fraction of 66 % by Dozier's method of disks. There are no regional wall motion abnormalities seen. , cardiac monitoring                              SBP goal: <180    GASTROINTESTINAL:  dysphagia screen- passed       Diet: Regular    RENAL: BUN/Cr stable, good urine output      Na Goal: Greater than 135     Arshad: N    HEMATOLOGY: H/H stable, Platelets normal, CT C/A/P: Pneumomediastinum tracking up the lower cervical neck of indeterminate   etiology. No evidence of malignancy in the chest, abdomen or pelvis. Thoracic surgery consulted for Pneumomediastinum: S/P gastrograffin esophagram   : No fluoroscopic evidence of esophageal rupture. Hypercoag work up negative.      DVT ppx: Heparin s.c [] LMWH [x]     ID: afebrile, no leukocytosis     OTHER: plan of care discussed with patient and  at bedside     DISPOSITION: Home with outpatient therapy      CORE MEASURES:        Admission NIHSS:      TPA: [] YES [x] NO      LDL/HDL: 171/95     Depression Screen: 0     Statin Therapy: Y     Dysphagia Screen: [x] PASS [] FAIL     Smoking [] YES [x] NO      Afib [] YES [x] NO     Stroke Education [x] YES [] NO    Obtain screening lower extremity venous ultrasound in patients who meet 1 or more of the following criteria as patient is high risk for DVT/PE on admission:   [] History of DVT/PE  []Hypercoagulable states (Factor V Leiden, Cancer, OCP, etc. )  []Prolonged immobility (hemiplegia/hemiparesis/post operative or any other extended immobilization)  [] Transferred from outside facility (Rehab or Long term care)  [] Age </= to 50. 39 year old right handed female with PMHx PMDD who initially presented to [facility] for changes in speech, vision, and right sided numbness/weakness. Of note patient had a recent gastroenteritis with severe vomiting. LKW on 12/13/2024 at around 23:00. mRS 0. NIHSS was 2 for visual field defect in the temporal aspect of the R eye and mild RUE drift. CTH w/o contrast at [facility] revealed acute left occipital lobe infarct with mild associated edema and no ICH. CT perfusion revealed moderate perfusion defect in the left posterior cerebral artery. CTA H/N revealed occlusion of the V2 segment of the right vertebral artery, occlusion of the P2 segment of the left posterior cerebral artery. Tenecteplase was not administered as patient presented outside of window. Mechanical thrombectomy was not performed due to low NIHSS and distal occlusion. 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    NEURO: Improved since admission, Continue close monitoring for neurologic deterioration, permissive HTN, : high dose statin MRI Brain w/o, MRA Head w/o and Neck w/contrast noted above. Physical therapy/OT: home with outpatient therapy    ANTITHROMBOTIC THERAPY: Plan for Eliquis 5 mg BID for 3 months, cleared by thoracic surgery (Sammie Buchanan) prior to starting    PULMONARY: CXR clear 12/16, protecting airway, saturating well     CARDIOVASCULAR:  TTE:  Left ventricular cavity is normal in size. Left ventricular systolic function is normal with an ejection fraction of 66 % by Dozier's method of disks. There are no regional wall motion abnormalities seen. , cardiac monitoring                              SBP goal: <180    GASTROINTESTINAL:  dysphagia screen- passed       Diet: Regular    RENAL: BUN/Cr stable, good urine output      Na Goal: Greater than 135     Arshad: N    HEMATOLOGY: H/H stable, Platelets normal, CT C/A/P: Pneumomediastinum tracking up the lower cervical neck of indeterminate   etiology. No evidence of malignancy in the chest, abdomen or pelvis. Thoracic surgery consulted for Pneumomediastinum: S/P gastrograffin esophagram   : No fluoroscopic evidence of esophageal rupture. Hypercoag work up negative.      DVT ppx: Heparin s.c [] LMWH [x]     ID: afebrile, no leukocytosis     OTHER: plan of care discussed with patient and  at bedside     DISPOSITION: Home with outpatient therapy      CORE MEASURES:        Admission NIHSS:      TPA: [] YES [x] NO      LDL/HDL: 171/95     Depression Screen: 0     Statin Therapy: Y     Dysphagia Screen: [x] PASS [] FAIL     Smoking [] YES [x] NO      Afib [] YES [x] NO     Stroke Education [x] YES [] NO    Obtain screening lower extremity venous ultrasound in patients who meet 1 or more of the following criteria as patient is high risk for DVT/PE on admission:   [] History of DVT/PE  []Hypercoagulable states (Factor V Leiden, Cancer, OCP, etc. )  []Prolonged immobility (hemiplegia/hemiparesis/post operative or any other extended immobilization)  [] Transferred from outside facility (Rehab or Long term care)  [] Age </= to 50. 39 year old right handed female with PMHx PMDD who initially presented to [facility] for changes in speech, vision, and right sided numbness/weakness. Of note patient had a recent gastroenteritis with severe vomiting. LKW on 12/13/2024 at around 23:00. mRS 0. NIHSS was 2 for visual field defect in the temporal aspect of the R eye and mild RUE drift. CTH w/o contrast at [facility] revealed acute left occipital lobe infarct with mild associated edema and no ICH. CT perfusion revealed moderate perfusion defect in the left posterior cerebral artery. CTA H/N revealed occlusion of the V2 segment of the right vertebral artery, occlusion of the P2 segment of the left posterior cerebral artery. Tenecteplase was not administered as patient presented outside of window. Mechanical thrombectomy was not performed due to low NIHSS and distal occlusion. 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    NEURO: Improved since admission, Continue close monitoring for neurologic deterioration, permissive HTN, : high dose statin MRI Brain w/o, MRA Head w/o and Neck w/contrast noted above. Physical therapy/OT: home with outpatient therapy    ANTITHROMBOTIC THERAPY: Plan for Eliquis 5 mg BID for 3-6 months, cleared by thoracic surgery (Sammie Buchanan) prior to starting    PULMONARY: CXR clear 12/16, protecting airway, saturating well     CARDIOVASCULAR:  TTE:  Left ventricular cavity is normal in size. Left ventricular systolic function is normal with an ejection fraction of 66 % by Dozier's method of disks. There are no regional wall motion abnormalities seen. , cardiac monitoring                              SBP goal: <180    GASTROINTESTINAL:  dysphagia screen- passed       Diet: Regular    RENAL: BUN/Cr stable, good urine output      Na Goal: Greater than 135     Arshad: N    HEMATOLOGY: H/H stable, Platelets normal, CT C/A/P: Pneumomediastinum tracking up the lower cervical neck of indeterminate   etiology. No evidence of malignancy in the chest, abdomen or pelvis. Thoracic surgery consulted for Pneumomediastinum: S/P gastrograffin esophagram   : No fluoroscopic evidence of esophageal rupture. Hypercoag work up negative.      DVT ppx: Heparin s.c [] LMWH [x]     ID: afebrile, no leukocytosis     OTHER: plan of care discussed with patient and  at bedside     DISPOSITION: Home with outpatient therapy      CORE MEASURES:        Admission NIHSS:      TPA: [] YES [x] NO      LDL/HDL: 171/95     Depression Screen: 0     Statin Therapy: Y     Dysphagia Screen: [x] PASS [] FAIL     Smoking [] YES [x] NO      Afib [] YES [x] NO     Stroke Education [x] YES [] NO    Obtain screening lower extremity venous ultrasound in patients who meet 1 or more of the following criteria as patient is high risk for DVT/PE on admission:   [] History of DVT/PE  []Hypercoagulable states (Factor V Leiden, Cancer, OCP, etc. )  []Prolonged immobility (hemiplegia/hemiparesis/post operative or any other extended immobilization)  [] Transferred from outside facility (Rehab or Long term care)  [] Age </= to 50.

## 2024-12-18 NOTE — SPEECH LANGUAGE PATHOLOGY EVALUATION - SLP PERTINENT HISTORY OF CURRENT PROBLEM
39 year old right handed female with PMHx PMDD who initially presented to [facility] for changes in speech, vision, and right sided numbness/weakness. Of note patient had a recent gastroenteritis with severe vomiting. LKW on 12/13/2024 at around 23:00. mRS 0. NIHSS was 2 for visual field defect in the temporal aspect of the R eye and mild RUE drift. CTH w/o contrast at [facility] revealed acute left occipital lobe infarct with mild associated edema and no ICH. CT perfusion revealed moderate perfusion defect in the left posterior cerebral artery. CTA H/N revealed occlusion of the V2 segment of the right vertebral artery, occlusion of the P2 segment of the left posterior cerebral artery. Tenecteplase was not administered as patient presented outside of window. Mechanical thrombectomy was not performed due to low NIHSS and distal occlusion. Patient was transferred to [facility] for further evaluation.

## 2024-12-18 NOTE — DISCHARGE NOTE PROVIDER - HOSPITAL COURSE
39F no pmhx presents as a transfer for [facility] for stroke workup. On 12/13/24, patient developed "stomach bug", had nausea, vomiting, and diarrhea. States she vomiting about 10 times, had retching. Denies blood in stool or vomit. She went to sleep 12/13/24 around 11pm, which was her last known well. She woke up 12/14/24 AM w/ RIGHT arm and leg numbness and RIGHT arm and leg weakness. She went to [facility] for evaluation, where they found a LEFT occipital infarct w/ LEFT PCA perfusion defect and vertebral thrombosis vs. dissection. She was also noted to have pneumomediastinum. She was out of the window for TNK, so it was not given. She was monitored at [facility] and then transferred to [facility] for further eval. She denies additional new symptoms now; continues ot have RIGHT arm and leg numbness and weakness. Diarrhea and vomiting have since resolved. Denies vision changes.    Imaging:  MRI HEAD: Evolving acute/subacute left-sided PCA distribution   infarction with associated cytotoxic edema. Small rounded focus of   petechial hemorrhagic transformation in the left thalamic infarction bed.     MRA HEAD and NECK:   1.  RIGHT CAROTID NECK CIRCULATION:   Intact.  2.  LEFT CAROTID NECK CIRCULATION:    Intact.  3.  VERTEBRAL NECK CIRCULATION:   Patent dominant caliber left vertebral   artery. Occluded right vertebral artery. T1 hyperintensity is consistent   with arterial dissection  4.  ANTERIOR INTRACRANIAL CIRCULATION:     Intact.  5.  POSTERIOR INTRACRANIAL CIRCULATION:   Occluded and flow right   vertebral artery. Reversal of flow from the basilar artery to its patent   PICA. Left posterior cerebral artery occludes at its distal P1 segment.     Vertebral and right posterior cerebral arteries are intact.    Review of [facility] records  CTH noncon - acute left occipital lobe infarct w/ mild associated edema. No evidence of acute intracranial hemorrhage. Submucosal air tracking along the deep neck soft tissue posterior to the pharynx, related to pneumomediastinum visualized on same day CTA neck  CT head brain perfusion: moderate perfusion defect in the left posterior cerebral artery territory  CTA head and neck: occlusion of the V2 segment of the right vertebral artery at C3-C4 may reflect acute thrombosis or dissection. Reconstitution at the V4 segment likely reflects retrograde filling. Occlusion of the P2 segment of the left posterior cerebral artery with diminished vascularity in the left inferior PCA territory. 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  Antithrombotic: Eliquis 5 mg BID for 3 months  TTE:  Left ventricular cavity is normal in size. Left ventricular systolic function is normal with an ejection fraction of 66 % by Dozier's method of disks. There are no regional wall motion abnormalities seen.  CT C/A/P: Pneumomediastinum tracking up the lower cervical neck of indeterminate etiology. No evidence of malignancy in the chest, abdomen or pelvis. Thoracic surgery consulted for Pneumomediastinum: S/P gastrograffin esophagram   : No fluoroscopic evidence of esophageal rupture and was cleared to start AC. Hypercoag work up negative.     Evaluated by PT/OT and was recommended home with outpatient therapy. Patient stable for discharge. 39F no pmhx presents as a transfer for [facility] for stroke workup. On 12/13/24, patient developed "stomach bug", had nausea, vomiting, and diarrhea. States she vomiting about 10 times, had retching. Denies blood in stool or vomit. She went to sleep 12/13/24 around 11pm, which was her last known well. She woke up 12/14/24 AM w/ RIGHT arm and leg numbness and RIGHT arm and leg weakness. She went to [facility] for evaluation, where they found a LEFT occipital infarct w/ LEFT PCA perfusion defect and vertebral thrombosis vs. dissection. She was also noted to have pneumomediastinum. She was out of the window for TNK, so it was not given. She was monitored at [facility] and then transferred to [facility] for further eval. She denies additional new symptoms now; continues ot have RIGHT arm and leg numbness and weakness. Diarrhea and vomiting have since resolved. Denies vision changes.    Imaging:  MRI HEAD: Evolving acute/subacute left-sided PCA distribution   infarction with associated cytotoxic edema. Small rounded focus of   petechial hemorrhagic transformation in the left thalamic infarction bed.     MRA HEAD and NECK:   1.  RIGHT CAROTID NECK CIRCULATION:   Intact.  2.  LEFT CAROTID NECK CIRCULATION:    Intact.  3.  VERTEBRAL NECK CIRCULATION:   Patent dominant caliber left vertebral   artery. Occluded right vertebral artery. T1 hyperintensity is consistent   with arterial dissection  4.  ANTERIOR INTRACRANIAL CIRCULATION:     Intact.  5.  POSTERIOR INTRACRANIAL CIRCULATION:   Occluded and flow right   vertebral artery. Reversal of flow from the basilar artery to its patent   PICA. Left posterior cerebral artery occludes at its distal P1 segment.     Vertebral and right posterior cerebral arteries are intact.    Review of [facility] records  CTH noncon - acute left occipital lobe infarct w/ mild associated edema. No evidence of acute intracranial hemorrhage. Submucosal air tracking along the deep neck soft tissue posterior to the pharynx, related to pneumomediastinum visualized on same day CTA neck  CT head brain perfusion: moderate perfusion defect in the left posterior cerebral artery territory  CTA head and neck: occlusion of the V2 segment of the right vertebral artery at C3-C4 may reflect acute thrombosis or dissection. Reconstitution at the V4 segment likely reflects retrograde filling. Occlusion of the P2 segment of the left posterior cerebral artery with diminished vascularity in the left inferior PCA territory. 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  Antithrombotic: Eliquis 5 mg BID for 3 months  TTE:  Left ventricular cavity is normal in size. Left ventricular systolic function is normal with an ejection fraction of 66 % by Dozier's method of disks. There are no regional wall motion abnormalities seen.  CT C/A/P: Pneumomediastinum tracking up the lower cervical neck of indeterminate etiology. No evidence of malignancy in the chest, abdomen or pelvis. Thoracic surgery consulted for Pneumomediastinum: S/P gastrograffin esophagram   : No fluoroscopic evidence of esophageal rupture and was cleared to start AC. Hypercoag work up negative.     Evaluated by PT/OT and was recommended home with outpatient therapy. Patient stable for discharge.

## 2024-12-18 NOTE — DISCHARGE NOTE NURSING/CASE MANAGEMENT/SOCIAL WORK - PATIENT PORTAL LINK FT
You can access the FollowMyHealth Patient Portal offered by [facility] by registering at the following website: [email or web address]. By joining [employer]’s FollowMyHealth portal, you will also be able to view your health information using other applications (apps) compatible with our system.

## 2024-12-18 NOTE — DISCHARGE NOTE PROVIDER - NSDCCPCAREPLAN_GEN_ALL_CORE_FT
PRINCIPAL DISCHARGE DIAGNOSIS  Diagnosis: Dissection, vertebral artery  Assessment and Plan of Treatment: Please follow up with neurologist in 1 week. The office will call you to schedule an appointment, if you do not hear from them please call [phone]. Continue taking medications as prescribed. If you were prescribed a statin for your cholesterol please make sure you have your liver enzymes checked with your primary care physician. Monitor your blood pressure. Reduce fat, cholesterol and salt in your diet. Increase intake of fruits and vegetables. Limit alcohol to minimum and do not smoke. You may be at risk for falling, make changes to your home to help you walk easier. Keep up to date on vaccinations.  If you experience any symptoms of facial drooping, slurred speech, arm or leg weakness, severe headache, vision changes or any worsening symptoms, notify provider immediately and return to ER.  Continue Eliquis 5 mg BID for 3 months

## 2024-12-18 NOTE — DISCHARGE NOTE PROVIDER - PROVIDER TOKENS
PROVIDER:[TOKEN:[[id number]],FOLLOWUP:[2 weeks]],PROVIDER:[TOKEN:[[id number]],FOLLOWUP:[1 month]]

## 2024-12-18 NOTE — PROGRESS NOTE ADULT - SUBJECTIVE AND OBJECTIVE BOX
THE PATIENT WAS SEEN AND EXAMINED BY ME WITH THE HOUSESTAFF AND STROKE TEAM DURING MORNING ROUNDS.   HPI:  39F no pmhx presents as a transfer for [facility] for stroke workup. On 12/13/24, patient developed "stomach bug", had nausea, vomiting, and diarrhea. States she vomiting about 10 times, had retching. Denies blood in stool or vomit. She went to sleep 12/13/24 around 11pm, which was her last known well. She woke up 12/14/24 AM w/ RIGHT arm and leg numbness and RIGHT arm and leg weakness. She went to [facility] for evaluation, where they found a LEFT occipital infarct w/ LEFT PCA perfusion defect and vertebral thrombosis vs. dissection. She was also noted to have pneumomediastinum. She was out of the window for TNK, so it was not given. She was monitored at [facility] and then transferred to [facility] for further eval. She denies additional new symptoms now; continues ot have RIGHT arm and leg numbness and weakness. Diarrhea and vomiting have since resolved. Denies vision changes.        SUBJECTIVE: No events overnight.  No new neurologic complaints.      acetaminophen     Tablet .. 650 milliGRAM(s) Oral every 6 hours PRN  atorvastatin 80 milliGRAM(s) Oral at bedtime  enoxaparin Injectable 40 milliGRAM(s) SubCutaneous every 24 hours  FLUoxetine 20 milliGRAM(s) Oral daily PRN  pantoprazole    Tablet 40 milliGRAM(s) Oral before breakfast  sodium chloride 0.9%. 1000 milliLiter(s) IV Continuous <Continuous>      PHYSICAL EXAM:   Vital Signs Last 24 Hrs  T(C): 36.7 (18 Dec 2024 04:33), Max: 37.2 (17 Dec 2024 08:42)  T(F): 98 (18 Dec 2024 04:33), Max: 99 (17 Dec 2024 08:42)  HR: 71 (18 Dec 2024 04:33) (71 - 87)  BP: 105/74 (18 Dec 2024 04:33) (95/63 - 111/77)  BP(mean): --  RR: 18 (18 Dec 2024 04:33) (18 - 18)  SpO2: 97% (18 Dec 2024 04:33) (96% - 99%)    Parameters below as of 18 Dec 2024 04:33  Patient On (Oxygen Delivery Method): room air        General: No acute distress  HEENT: EOM intact, visual fields full  Abdomen: Soft, nontender, nondistended   Extremities: No edema    NEUROLOGICAL EXAM:  Mental status: Awake, alert, oriented x3, mildly dysfluent (occasional word-finding pauses) with occasional semantic paraphasic errors.  Cranial Nerves: No facial asymmetry, no nystagmus, no dysarthria,  tongue midline  Motor exam: Normal tone, no drift, 5/5 RUE, 5/5 RLE, 5/5 LUE, 5/5 LLE, normal fine finger movements.  Sensation: Intact to light touch   Coordination/ Gait: No dysmetria, BERNARDO intact and symmetric bilaterally    LABS:                        13.4   5.51  )-----------( 196      ( 18 Dec 2024 06:23 )             38.8    12-18    138  |  104  |  13  ----------------------------<  98  3.9   |  20[L]  |  0.75    Ca    8.7      18 Dec 2024 06:21  Phos  3.8     12-18  Mg     2.2     12-18    TPro  6.2  /  Alb  3.3  /  TBili  1.0  /  DBili  x   /  AST  14  /  ALT  11  /  AlkPhos  62  12-18  PTT - ( 17 Dec 2024 06:42 )  PTT:26.8 sec      IMAGING: Reviewed by me.   MRI HEAD: Evolving acute/subacute left-sided PCA distribution   infarction with associated cytotoxic edema. Small rounded focus of   petechial hemorrhagic transformation in the left thalamic infarction bed.     MRA HEAD and NECK:   1.  RIGHT CAROTID NECK CIRCULATION:   Intact.  2.  LEFT CAROTID NECK CIRCULATION:    Intact.  3.  VERTEBRAL NECK CIRCULATION:   Patent dominant caliber left vertebral   artery. Occluded right vertebral artery. T1 hyperintensity is consistent   with arterial dissection  4.  ANTERIOR INTRACRANIAL CIRCULATION:     Intact.  5.  POSTERIOR INTRACRANIAL CIRCULATION:   Occluded and flow right   vertebral artery. Reversal of flow from the basilar artery to its patent   PICA. Left posterior cerebral artery occludes at its distal P1 segment.     Vertebral and right posterior cerebral arteries are intact.    Review of [facility] records  CTH noncon - acute left occipital lobe infarct w/ mild associated edema. No evidence of acute intracranial hemorrhage. Submucosal air tracking along the deep neck soft tissue posterior to the pharynx, related to pneumomediastinum visualized on same day CTA neck  CT head brain perfusion: moderate perfusion defect in the left posterior cerebral artery territory  CTA head and neck: occlusion of the V2 segment of the right vertebral artery at C3-C4 may reflect acute thrombosis or dissection. Reconstitution at the V4 segment likely reflects retrograde filling. Occlusion of the P2 segment of the left posterior cerebral artery with diminished vascularity in the left inferior PCA territory. Partially visualized pneumomediastinum tracking to the neck within the deep neck soft tissue   THE PATIENT WAS SEEN AND EXAMINED BY ME WITH THE HOUSESTAFF AND STROKE TEAM DURING MORNING ROUNDS.   HPI:  39F no pmhx presents as a transfer for [facility] for stroke workup. On 12/13/24, patient developed "stomach bug", had nausea, vomiting, and diarrhea. States she vomiting about 10 times, had retching. Denies blood in stool or vomit. She went to sleep 12/13/24 around 11pm, which was her last known well. She woke up 12/14/24 AM w/ RIGHT arm and leg numbness and RIGHT arm and leg weakness. She went to [facility] for evaluation, where they found a LEFT occipital infarct w/ LEFT PCA perfusion defect and vertebral thrombosis vs. dissection. She was also noted to have pneumomediastinum. She was out of the window for TNK, so it was not given. She was monitored at [facility] and then transferred to [facility] for further eval. She denies additional new symptoms now; continues ot have RIGHT arm and leg numbness and weakness. Diarrhea and vomiting have since resolved. Denies vision changes.        SUBJECTIVE: No events overnight.  No new neurologic complaints.      acetaminophen     Tablet .. 650 milliGRAM(s) Oral every 6 hours PRN  atorvastatin 80 milliGRAM(s) Oral at bedtime  enoxaparin Injectable 40 milliGRAM(s) SubCutaneous every 24 hours  FLUoxetine 20 milliGRAM(s) Oral daily PRN  pantoprazole    Tablet 40 milliGRAM(s) Oral before breakfast  sodium chloride 0.9%. 1000 milliLiter(s) IV Continuous <Continuous>      PHYSICAL EXAM:   Vital Signs Last 24 Hrs  T(C): 36.7 (18 Dec 2024 04:33), Max: 37.2 (17 Dec 2024 08:42)  T(F): 98 (18 Dec 2024 04:33), Max: 99 (17 Dec 2024 08:42)  HR: 71 (18 Dec 2024 04:33) (71 - 87)  BP: 105/74 (18 Dec 2024 04:33) (95/63 - 111/77)  BP(mean): --  RR: 18 (18 Dec 2024 04:33) (18 - 18)  SpO2: 97% (18 Dec 2024 04:33) (96% - 99%)    Parameters below as of 18 Dec 2024 04:33  Patient On (Oxygen Delivery Method): room air        General: No acute distress  HEENT: EOM intact, RHH  Abdomen: Soft, nontender, nondistended   Extremities: No edema    NEUROLOGICAL EXAM:  Mental status: Awake, alert, oriented x3, mildly dysfluent (occasional word-finding pauses) with occasional semantic paraphasic errors.  Cranial Nerves: No facial asymmetry, no nystagmus, no dysarthria,  tongue midline  Motor exam: Normal tone, no drift, 5/5 RUE, 5/5 RLE, 5/5 LUE, 5/5 LLE, normal fine finger movements.  Sensation: Intact to light touch   Coordination/ Gait: No dysmetria, BERNARDO intact and symmetric bilaterally    LABS:                        13.4   5.51  )-----------( 196      ( 18 Dec 2024 06:23 )             38.8    12-18    138  |  104  |  13  ----------------------------<  98  3.9   |  20[L]  |  0.75    Ca    8.7      18 Dec 2024 06:21  Phos  3.8     12-18  Mg     2.2     12-18    TPro  6.2  /  Alb  3.3  /  TBili  1.0  /  DBili  x   /  AST  14  /  ALT  11  /  AlkPhos  62  12-18  PTT - ( 17 Dec 2024 06:42 )  PTT:26.8 sec      IMAGING: Reviewed by me.   MRI HEAD: Evolving acute/subacute left-sided PCA distribution   infarction with associated cytotoxic edema. Small rounded focus of   petechial hemorrhagic transformation in the left thalamic infarction bed.     MRA HEAD and NECK:   1.  RIGHT CAROTID NECK CIRCULATION:   Intact.  2.  LEFT CAROTID NECK CIRCULATION:    Intact.  3.  VERTEBRAL NECK CIRCULATION:   Patent dominant caliber left vertebral   artery. Occluded right vertebral artery. T1 hyperintensity is consistent   with arterial dissection  4.  ANTERIOR INTRACRANIAL CIRCULATION:     Intact.  5.  POSTERIOR INTRACRANIAL CIRCULATION:   Occluded and flow right   vertebral artery. Reversal of flow from the basilar artery to its patent   PICA. Left posterior cerebral artery occludes at its distal P1 segment.     Vertebral and right posterior cerebral arteries are intact.    Review of [facility] records  CTH noncon - acute left occipital lobe infarct w/ mild associated edema. No evidence of acute intracranial hemorrhage. Submucosal air tracking along the deep neck soft tissue posterior to the pharynx, related to pneumomediastinum visualized on same day CTA neck  CT head brain perfusion: moderate perfusion defect in the left posterior cerebral artery territory  CTA head and neck: occlusion of the V2 segment of the right vertebral artery at C3-C4 may reflect acute thrombosis or dissection. Reconstitution at the V4 segment likely reflects retrograde filling. Occlusion of the P2 segment of the left posterior cerebral artery with diminished vascularity in the left inferior PCA territory. Partially visualized pneumomediastinum tracking to the neck within the deep neck soft tissue   THE PATIENT WAS SEEN AND EXAMINED BY ME WITH THE HOUSESTAFF AND STROKE TEAM DURING MORNING ROUNDS.   HPI:  39F no pmhx presents as a transfer for [facility] for stroke workup. On 12/13/24, patient developed "stomach bug", had nausea, vomiting, and diarrhea. States she vomiting about 10 times, had retching. Denies blood in stool or vomit. She went to sleep 12/13/24 around 11pm, which was her last known well. She woke up 12/14/24 AM w/ RIGHT arm and leg numbness and RIGHT arm and leg weakness. She went to [facility] for evaluation, where they found a LEFT occipital infarct w/ LEFT PCA perfusion defect and vertebral thrombosis vs. dissection. She was also noted to have pneumomediastinum. She was out of the window for TNK, so it was not given. She was monitored at [facility] and then transferred to [facility] for further eval. She denies additional new symptoms now; continues ot have RIGHT arm and leg numbness and weakness. Diarrhea and vomiting have since resolved. Denies vision changes.        SUBJECTIVE: No events overnight.  No new neurologic complaints.      acetaminophen     Tablet .. 650 milliGRAM(s) Oral every 6 hours PRN  atorvastatin 80 milliGRAM(s) Oral at bedtime  enoxaparin Injectable 40 milliGRAM(s) SubCutaneous every 24 hours  FLUoxetine 20 milliGRAM(s) Oral daily PRN  pantoprazole    Tablet 40 milliGRAM(s) Oral before breakfast  sodium chloride 0.9%. 1000 milliLiter(s) IV Continuous <Continuous>      PHYSICAL EXAM:   Vital Signs Last 24 Hrs  T(C): 36.7 (18 Dec 2024 04:33), Max: 37.2 (17 Dec 2024 08:42)  T(F): 98 (18 Dec 2024 04:33), Max: 99 (17 Dec 2024 08:42)  HR: 71 (18 Dec 2024 04:33) (71 - 87)  BP: 105/74 (18 Dec 2024 04:33) (95/63 - 111/77)  BP(mean): --  RR: 18 (18 Dec 2024 04:33) (18 - 18)  SpO2: 97% (18 Dec 2024 04:33) (96% - 99%)    Parameters below as of 18 Dec 2024 04:33  Patient On (Oxygen Delivery Method): room air        General: No acute distress  HEENT: EOM intact, RHH  Abdomen: Soft, nontender, nondistended   Extremities: No edema    NEUROLOGICAL EXAM:  Mental status: Awake, alert, oriented x3, mildly dysfluent (occasional word-finding pauses) with occasional semantic paraphasic errors.  Cranial Nerves: R homonymous hemioanopia, worse in superior quadrant;  No facial asymmetry, no nystagmus, no dysarthria,  tongue midline  Motor exam: Normal tone, no drift, 5/5 RUE, 5/5 RLE, 5/5 LUE, 5/5 LLE, normal fine finger movements.  Sensation: Intact to light touch   Coordination/ Gait: No dysmetria, BERNARDO intact and symmetric bilaterally    LABS:                        13.4   5.51  )-----------( 196      ( 18 Dec 2024 06:23 )             38.8    12-18    138  |  104  |  13  ----------------------------<  98  3.9   |  20[L]  |  0.75    Ca    8.7      18 Dec 2024 06:21  Phos  3.8     12-18  Mg     2.2     12-18    TPro  6.2  /  Alb  3.3  /  TBili  1.0  /  DBili  x   /  AST  14  /  ALT  11  /  AlkPhos  62  12-18  PTT - ( 17 Dec 2024 06:42 )  PTT:26.8 sec      IMAGING: Reviewed by me.   MRI HEAD: Evolving acute/subacute left-sided PCA distribution   infarction with associated cytotoxic edema. Small rounded focus of   petechial hemorrhagic transformation in the left thalamic infarction bed.     MRA HEAD and NECK:   1.  RIGHT CAROTID NECK CIRCULATION:   Intact.  2.  LEFT CAROTID NECK CIRCULATION:    Intact.  3.  VERTEBRAL NECK CIRCULATION:   Patent dominant caliber left vertebral   artery. Occluded right vertebral artery. T1 hyperintensity is consistent   with arterial dissection  4.  ANTERIOR INTRACRANIAL CIRCULATION:     Intact.  5.  POSTERIOR INTRACRANIAL CIRCULATION:   Occluded and flow right   vertebral artery. Reversal of flow from the basilar artery to its patent   PICA. Left posterior cerebral artery occludes at its distal P1 segment.     Vertebral and right posterior cerebral arteries are intact.    Review of [facility] records  CTH noncon - acute left occipital lobe infarct w/ mild associated edema. No evidence of acute intracranial hemorrhage. Submucosal air tracking along the deep neck soft tissue posterior to the pharynx, related to pneumomediastinum visualized on same day CTA neck  CT head brain perfusion: moderate perfusion defect in the left posterior cerebral artery territory  CTA head and neck: occlusion of the V2 segment of the right vertebral artery at C3-C4 may reflect acute thrombosis or dissection. Reconstitution at the V4 segment likely reflects retrograde filling. Occlusion of the P2 segment of the left posterior cerebral artery with diminished vascularity in the left inferior PCA territory. Partially visualized pneumomediastinum tracking to the neck within the deep neck soft tissue

## 2024-12-18 NOTE — DISCHARGE NOTE PROVIDER - CARE PROVIDER_API CALL
Zuri Locke  NP in Family Health  [address], [address]  [place] [address]  Phone: [phone]  Fax: [phone]  Follow Up Time: 2 weeks    Donn Moscoso  Ophthalmology  [address], [facility] [[email or web address] [id number]  [place] [address]  Phone: [phone]  Fax: [phone]  Follow Up Time: 1 month

## 2024-12-18 NOTE — DISCHARGE NOTE PROVIDER - NSDCMRMEDTOKEN_GEN_ALL_CORE_FT
apixaban 5 mg oral tablet: 1 tab(s) orally every 12 hours  atorvastatin 80 mg oral tablet: 1 tab(s) orally once a day (at bedtime)  FLUoxetine 20 mg oral capsule: 1 cap(s) orally once a day as needed for  premenstrual dysphoria disorder  Outpatient Occupational Therapy: Dx: Stroke  For vision, strength, coordination, and balance  Outpatient Physical Therapy: Dx: stroke  pantoprazole 40 mg oral delayed release tablet: 1 tab(s) orally once a day

## 2024-12-18 NOTE — PROGRESS NOTE ADULT - SUBJECTIVE AND OBJECTIVE BOX
Subjective: "Hello"  OOB chair      V/S  T(C): 36.6 (12-18-24 @ 08:32), Max: 37.2 (12-17-24 @ 20:20)  HR: 76 (12-18-24 @ 08:32) (71 - 87)  BP: 106/72 (12-18-24 @ 08:32) (95/63 - 111/77)  RR: 18 (12-18-24 @ 08:32) (18 - 18)  SpO2: 99% (12-18-24 @ 08:32) (96% - 99%)      I&O's Detail    17 Dec 2024 07:01  -  18 Dec 2024 07:00  --------------------------------------------------------  IN:    Oral Fluid: 240 mL  Total IN: 240 mL    OUT:  Total OUT: 0 mL    Total NET: 240 mL          12-17-24 @ 07:01  -  12-18-24 @ 07:00  --------------------------------------------------------  IN: 240 mL / OUT: 0 mL / NET: 240 mL      MEDICATIONS  (STANDING):  atorvastatin 80 milliGRAM(s) Oral at bedtime  enoxaparin Injectable 40 milliGRAM(s) SubCutaneous every 24 hours  pantoprazole    Tablet 40 milliGRAM(s) Oral before breakfast  sodium chloride 0.9%. 1000 milliLiter(s) (100 mL/Hr) IV Continuous <Continuous>      12-18    138  |  104  |  13  ----------------------------<  98  3.9   |  20[L]  |  0.75    Ca    8.7      18 Dec 2024 06:21  Phos  3.8     12-18  Mg     2.2     12-18    TPro  6.2  /  Alb  3.3  /  TBili  1.0  /  DBili  x   /  AST  14  /  ALT  11  /  AlkPhos  62  12-18                               13.4   5.51  )-----------( 196      ( 18 Dec 2024 06:23 )             38.8        PTT - ( 17 Dec 2024 06:42 )  PTT:26.8 sec         Physical Exam:    General: Age-appearing  Cardiovascular: +S1, S2; Regular rate and rhythm  Respiratory: CTA BL, no wheezes, rales, rhonchi  Gastrointestinal: Abdomen soft, non-tender, +BS in all 4 quadrants  Extremities: No  edema  Neuro: Non-focal, AAOx4, sensation intact BL        PAST MEDICAL & SURGICAL HISTORY:  No pertinent past medical history      S/P tonsillectomy

## 2024-12-18 NOTE — SPEECH LANGUAGE PATHOLOGY EVALUATION - SLP DIAGNOSIS
38 y/o female with left PCA acute ischemic stroke. Pt presents with aphasia, with expressive deficits > receptive. Pt able to follow multi-step commands, though has some confusion re: directives for higher level language tasks, benefitting from repetition. Word-finding/ naming is impaired. Expressive output notable for hesitations, circumlocutions, and occasional perseveration. Speech is fully intelligible.

## 2024-12-18 NOTE — PROGRESS NOTE ADULT - ASSESSMENT
39F no pmhx presents as a transfer for [facility] for stroke workup. On 12/13/24, patient developed "stomach bug", had nausea, vomiting, and diarrhea. States she vomiting about 10 times, had retching. Denies blood in stool or vomit. She went to sleep 12/13/24 around 11pm, which was her last known well. She woke up 12/14/24 AM w/ RIGHT arm and leg numbness and RIGHT arm and leg weakness. She went to [facility] for evaluation, where they found a LEFT occipital stroke at [facility]. On CT scans, patient found to have pneumomediastinum. Thoracic surgery consulted.   12/17 Esophagram completed> await read  12/18 Esophagram no leak> progress diet  Thoracic surgery to sign off

## 2024-12-18 NOTE — SPEECH LANGUAGE PATHOLOGY EVALUATION - COMMENTS
NEW PATIENT SCHEDULING QUESTIONNAIRE    Intake completed by: Blayne (Father)  Contact Number: [phone]    Would you briefly tell me the primary reason that you need an appointment? Depression    Main Issue for Patient is: Depression    Did anyone refer you (your child) for treatment? NO    Insurance Questions    What insurance do you (does your child) have? PPO- BCBS     For PPO Patients Only: Please contact the number on the back of your (your child's) insurance card to inquire what your (your child's) benefits are for behavioral/mental health services and to check if pre-certification is needed: Patient/Parent was advised: YES     For PPO Patients Only: Would you like our Billing Tax ID Number to help your insurance company look up our provider: No - Declined      QUESTIONS FOR REFERRING PATIENT TO TRIAGE    Are you (your child) currently admitted to a hospital/IOP/PHP program for a mental health condition or have you (your child) recently been discharged in the last 2 weeks from a hospital for a mental health condition? NO       In the past 2 weeks, have you (your child) had any of the following?    Any thoughts of harming yourself/themselves? YES  Any thoughts of harming others? NO  Have you (your child) had any self-injury or cutting issues in the last 2 weeks? NO  Hallucinations (auditory or visual)? NO    Did patient answer \"Yes\" to any of the above Triage Questions: Yes     Patient notified that, after scheduling appointment, they will be transferred to an RN to discuss the triage symptoms: Yes         For female patients ages 12-53: Are you (your child) currently pregnant or have recently given birth in the last two months? N/A        APPOINTMENT SCHEDULING QUESTIONS    Have you (your child) been previously diagnosed with a mental health condition OR have you been RECENTLY hospitalized for a mental health condition? NO    Are you interested in scheduling with a provider who can prescribe medication?  NO    Our doctors recommend talk therapy for most patients; Are you interested in scheduling with a talk therapist? YES     Are you seeking:   Individual Counseling? YES  Family Counseling? NO  Couple's Counseling? NO    Are there any concerns that you (your child) might have an eating disorder?  NO     Is patient's age 10 years or older? Yes    Issues with use of:  Alcohol? NO  Recreational substances? NO  Problems with other addictive behaviors (e.g. gambling, sexual issues, etc.)? NO  YES TO ANY- Consider Addictions Therapist (Refer to Scheduling Grid)      Have you seen a therapist outside of [facility] within the past year? NO        PATIENT ADVISORIES    If you have a family member being treated by one of our therapists or physicians, you need to schedule with a different therapist or physician than that family member (except for Child Psychiatrists): YES    Because our current access may be several weeks out we like you to know that, between now and your appointment, if you (your child) feels unsafe, you can call 911 or go to nearest hospital, without needing a provider's permission? YES    Would you like the 24 hour help lines for the Behavioral Health Hospitals in our area? No - Declined     Patient informed that if there are any changes between now and the patient's scheduled appointment, [facility] can provide walk in evaluations. YES    Is patient under 18 years old? YES   · Parent has been advised that the patient needs to be accompanied by a parent/legal guardian at the first visit: YES      Patient enrolled in [employer] (Ages 13 and Up Only)? N/A - Patient is 12 or Under    Did the patient answer \"Yes\" to any of the Triage questions? Yes        After scheduling, PSR will transfer to Call Center RN at [phone]: YES    Appointment scheduled with: Therapist Only - Grabiel Penn (Sarah, ages 12 and up) On Tuesday, 8/4/20 at 8:30AM    Does the patient want to be on the  Patient continues to show a right superior quadrantopia, and aphasia on exam. Right sided numbness and weakness has resolved. MRI shows large area of diffusion restriction is seen within the left PCA territory affecting the left thalamus and left posteromedial temporal and left occipital lobes. MRA H/N shows occlusion of right vertebral artery & T1 hyperintensity consistent with arterial dissection. Thoracic sx consulted as patient found to have pneumomediastinum, esophagram recommended to rule out esophageal injury -> No fluoroscopic evidence of esophageal rupture.    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   12/17: Pt with slightly worsening aphasia -> may be due to edema surrounding her infarction; doubt recurrent infarction or significant hemorrhage but this can be screened for.    Speech/swallow hx: Pt is new to this service; passed dysphagia screen, on regular texture diet Wait List? NO    Is patient pregnant or have recently given birth in the last two months or a recent hospital discharge? NO    ROUTE TO PROVIDER(S) AND CLOSE ENCOUNTER     Maintain good oral care

## 2024-12-18 NOTE — DISCHARGE NOTE NURSING/CASE MANAGEMENT/SOCIAL WORK - FINANCIAL ASSISTANCE
[facility] provides services at a reduced cost to those who are determined to be eligible through [facility]’s financial assistance program. Information regarding [facility]’s financial assistance program can be found by going to [email or web address] or by calling [phone].

## 2024-12-19 PROBLEM — Z78.9 OTHER SPECIFIED HEALTH STATUS: Chronic | Status: ACTIVE | Noted: 2024-12-16

## 2024-12-20 ENCOUNTER — NON-APPOINTMENT (OUTPATIENT)
Age: 39
End: 2024-12-20

## 2024-12-20 ENCOUNTER — APPOINTMENT (OUTPATIENT)
Dept: OPHTHALMOLOGY | Facility: CLINIC | Age: 39
End: 2024-12-20
Payer: COMMERCIAL

## 2024-12-20 PROCEDURE — 92083 EXTENDED VISUAL FIELD XM: CPT

## 2024-12-20 PROCEDURE — 92004 COMPRE OPH EXAM NEW PT 1/>: CPT

## 2024-12-30 ENCOUNTER — APPOINTMENT (OUTPATIENT)
Age: 39
End: 2024-12-30
Payer: COMMERCIAL

## 2024-12-30 VITALS — DIASTOLIC BLOOD PRESSURE: 63 MMHG | SYSTOLIC BLOOD PRESSURE: 80 MMHG

## 2024-12-30 DIAGNOSIS — H53.461 HOMONYMOUS BILATERAL FIELD DEFECTS, RIGHT SIDE: ICD-10-CM

## 2024-12-30 DIAGNOSIS — G81.91 HEMIPLEGIA, UNSPECIFIED AFFECTING RIGHT DOMINANT SIDE: ICD-10-CM

## 2024-12-30 DIAGNOSIS — I77.74 DISSECTION OF VERTEBRAL ARTERY: ICD-10-CM

## 2024-12-30 DIAGNOSIS — I63.532 CEREBRAL INFARCTION DUE TO UNSPECIFIED OCCLUSION OR STENOSIS OF LEFT POSTERIOR CEREBRAL ARTERY: ICD-10-CM

## 2024-12-30 PROCEDURE — G2212 PROLONG OUTPT/OFFICE VIS: CPT

## 2024-12-30 PROCEDURE — 99205 OFFICE O/P NEW HI 60 MIN: CPT

## 2024-12-30 RX ORDER — ATORVASTATIN CALCIUM 80 MG/1
80 TABLET, FILM COATED ORAL
Qty: 30 | Refills: 3 | Status: ACTIVE | COMMUNITY
Start: 2024-12-30 | End: 1900-01-01

## 2024-12-30 RX ORDER — APIXABAN 5 MG/1
5 TABLET, FILM COATED ORAL
Qty: 30 | Refills: 3 | Status: ACTIVE | COMMUNITY
Start: 2024-12-30 | End: 1900-01-01

## 2024-12-30 RX ORDER — ONDANSETRON 4 MG/1
4 TABLET, ORALLY DISINTEGRATING ORAL DAILY
Qty: 30 | Refills: 0 | Status: ACTIVE | COMMUNITY
Start: 2024-12-30 | End: 1900-01-01

## 2025-01-16 ENCOUNTER — APPOINTMENT (OUTPATIENT)
Dept: RHEUMATOLOGY | Facility: CLINIC | Age: 40
End: 2025-01-16
Payer: COMMERCIAL

## 2025-01-16 VITALS
DIASTOLIC BLOOD PRESSURE: 65 MMHG | SYSTOLIC BLOOD PRESSURE: 113 MMHG | OXYGEN SATURATION: 98 % | HEIGHT: 64 IN | HEART RATE: 84 BPM | BODY MASS INDEX: 18.86 KG/M2 | WEIGHT: 110.5 LBS

## 2025-01-16 DIAGNOSIS — G81.91 HEMIPLEGIA, UNSPECIFIED AFFECTING RIGHT DOMINANT SIDE: ICD-10-CM

## 2025-01-16 DIAGNOSIS — I63.532 CEREBRAL INFARCTION DUE TO UNSPECIFIED OCCLUSION OR STENOSIS OF LEFT POSTERIOR CEREBRAL ARTERY: ICD-10-CM

## 2025-01-16 DIAGNOSIS — I77.74 DISSECTION OF VERTEBRAL ARTERY: ICD-10-CM

## 2025-01-16 PROCEDURE — 99205 OFFICE O/P NEW HI 60 MIN: CPT

## 2025-01-17 LAB
ALBUMIN SERPL ELPH-MCNC: 4.5 G/DL
ALP BLD-CCNC: 88 U/L
ALT SERPL-CCNC: 22 U/L
ANA SER QL IA: NEGATIVE
ANION GAP SERPL CALC-SCNC: 12 MMOL/L
AST SERPL-CCNC: 18 U/L
BASOPHILS # BLD AUTO: 0.03 K/UL
BASOPHILS NFR BLD AUTO: 0.6 %
BILIRUB SERPL-MCNC: 1.5 MG/DL
BUN SERPL-MCNC: 13 MG/DL
CALCIUM SERPL-MCNC: 9 MG/DL
CENTROMERE IGG SER-ACNC: <0.2 AL
CHLORIDE SERPL-SCNC: 103 MMOL/L
CHROMATIN AB SERPL-ACNC: <0.2 AL
CK SERPL-CCNC: 48 U/L
CO2 SERPL-SCNC: 26 MMOL/L
CREAT SERPL-MCNC: 0.86 MG/DL
CRP SERPL-MCNC: <3 MG/L
DSDNA AB SER-ACNC: <1 IU/ML
EGFR: 87 ML/MIN/1.73M2
ENA JO1 AB SER IA-ACNC: <0.2 AL
ENA RNP AB SER IA-ACNC: <0.2 AL
ENA SCL70 IGG SER IA-ACNC: <0.2 AL
ENA SM AB SER IA-ACNC: <0.2 AL
ENA SS-A AB SER IA-ACNC: <0.2 AL
ENA SS-B AB SER IA-ACNC: <0.2 AL
EOSINOPHIL # BLD AUTO: 0.3 K/UL
EOSINOPHIL NFR BLD AUTO: 6.4 %
ERYTHROCYTE [SEDIMENTATION RATE] IN BLOOD BY WESTERGREN METHOD: 10 MM/HR
GLUCOSE SERPL-MCNC: 92 MG/DL
HCT VFR BLD CALC: 41.6 %
HGB BLD-MCNC: 14 G/DL
IMM GRANULOCYTES NFR BLD AUTO: 0.2 %
LYMPHOCYTES # BLD AUTO: 1.56 K/UL
LYMPHOCYTES NFR BLD AUTO: 33.5 %
MAN DIFF?: NORMAL
MCHC RBC-ENTMCNC: 29 PG
MCHC RBC-ENTMCNC: 33.7 G/DL
MCV RBC AUTO: 86.1 FL
MONOCYTES # BLD AUTO: 0.28 K/UL
MONOCYTES NFR BLD AUTO: 6 %
NEUTROPHILS # BLD AUTO: 2.48 K/UL
NEUTROPHILS NFR BLD AUTO: 53.3 %
PLATELET # BLD AUTO: 198 K/UL
POTASSIUM SERPL-SCNC: 4 MMOL/L
PROT SERPL-MCNC: 6.9 G/DL
RBC # BLD: 4.83 M/UL
RBC # FLD: 13.2 %
RIBOSOMAL P AB SER IA-ACNC: <0.2 AL
SODIUM SERPL-SCNC: 142 MMOL/L
TSH SERPL-ACNC: 1.23 UIU/ML
WBC # FLD AUTO: 4.66 K/UL

## 2025-01-19 LAB
ALBUMIN MFR SERPL ELPH: 62.4 %
ALBUMIN SERPL-MCNC: 4.4 G/DL
ALBUMIN/GLOB SERPL: 1.7 RATIO
ALPHA1 GLOB MFR SERPL ELPH: 4.3 %
ALPHA1 GLOB SERPL ELPH-MCNC: 0.3 G/DL
ALPHA2 GLOB MFR SERPL ELPH: 8.4 %
ALPHA2 GLOB SERPL ELPH-MCNC: 0.6 G/DL
B-GLOBULIN MFR SERPL ELPH: 11.8 %
B-GLOBULIN SERPL ELPH-MCNC: 0.8 G/DL
GAMMA GLOB FLD ELPH-MCNC: 0.9 G/DL
GAMMA GLOB MFR SERPL ELPH: 13.1 %
INTERPRETATION SERPL IEP-IMP: NORMAL
M PROTEIN SPEC IFE-MCNC: NORMAL
PROT SERPL-MCNC: 7 G/DL
PROT SERPL-MCNC: 7 G/DL

## 2025-02-03 ENCOUNTER — NON-APPOINTMENT (OUTPATIENT)
Age: 40
End: 2025-02-03

## 2025-02-07 ENCOUNTER — APPOINTMENT (OUTPATIENT)
Dept: RHEUMATOLOGY | Facility: CLINIC | Age: 40
End: 2025-02-07
Payer: COMMERCIAL

## 2025-02-07 DIAGNOSIS — I77.74 DISSECTION OF VERTEBRAL ARTERY: ICD-10-CM

## 2025-02-07 DIAGNOSIS — G81.91 HEMIPLEGIA, UNSPECIFIED AFFECTING RIGHT DOMINANT SIDE: ICD-10-CM

## 2025-02-07 DIAGNOSIS — I63.532 CEREBRAL INFARCTION DUE TO UNSPECIFIED OCCLUSION OR STENOSIS OF LEFT POSTERIOR CEREBRAL ARTERY: ICD-10-CM

## 2025-02-07 PROCEDURE — 99213 OFFICE O/P EST LOW 20 MIN: CPT | Mod: 95

## 2025-02-13 ENCOUNTER — APPOINTMENT (OUTPATIENT)
Dept: NEUROLOGY | Facility: CLINIC | Age: 40
End: 2025-02-13

## 2025-02-13 PROCEDURE — 95816 EEG AWAKE AND DROWSY: CPT

## 2025-02-14 PROCEDURE — 95708 EEG WO VID EA 12-26HR UNMNTR: CPT

## 2025-02-14 PROCEDURE — 95719 EEG PHYS/QHP EA INCR W/O VID: CPT

## 2025-02-14 PROCEDURE — 95700 EEG CONT REC W/VID EEG TECH: CPT

## 2025-02-24 ENCOUNTER — NON-APPOINTMENT (OUTPATIENT)
Age: 40
End: 2025-02-24

## 2025-02-25 ENCOUNTER — NON-APPOINTMENT (OUTPATIENT)
Age: 40
End: 2025-02-25

## 2025-03-05 ENCOUNTER — APPOINTMENT (OUTPATIENT)
Dept: MRI IMAGING | Facility: CLINIC | Age: 40
End: 2025-03-05

## 2025-03-05 ENCOUNTER — RESULT REVIEW (OUTPATIENT)
Age: 40
End: 2025-03-05

## 2025-03-05 ENCOUNTER — OUTPATIENT (OUTPATIENT)
Dept: OUTPATIENT SERVICES | Facility: HOSPITAL | Age: 40
LOS: 1 days | End: 2025-03-05
Payer: COMMERCIAL

## 2025-03-05 DIAGNOSIS — I63.532 CEREBRAL INFARCTION DUE TO UNSPECIFIED OCCLUSION OR STENOSIS OF LEFT POSTERIOR CEREBRAL ARTERY: ICD-10-CM

## 2025-03-05 DIAGNOSIS — G81.91 HEMIPLEGIA, UNSPECIFIED AFFECTING RIGHT DOMINANT SIDE: ICD-10-CM

## 2025-03-05 DIAGNOSIS — Z90.89 ACQUIRED ABSENCE OF OTHER ORGANS: Chronic | ICD-10-CM

## 2025-03-05 DIAGNOSIS — I77.74 DISSECTION OF VERTEBRAL ARTERY: ICD-10-CM

## 2025-03-05 PROCEDURE — 70553 MRI BRAIN STEM W/O & W/DYE: CPT | Mod: 26

## 2025-03-05 PROCEDURE — 70549 MR ANGIOGRAPH NECK W/O&W/DYE: CPT | Mod: 26

## 2025-03-05 PROCEDURE — 70553 MRI BRAIN STEM W/O & W/DYE: CPT

## 2025-03-05 PROCEDURE — 70549 MR ANGIOGRAPH NECK W/O&W/DYE: CPT

## 2025-03-05 PROCEDURE — A9585: CPT

## 2025-03-05 PROCEDURE — 70544 MR ANGIOGRAPHY HEAD W/O DYE: CPT | Mod: 26,59,59

## 2025-03-05 PROCEDURE — 70544 MR ANGIOGRAPHY HEAD W/O DYE: CPT

## 2025-04-07 ENCOUNTER — APPOINTMENT (OUTPATIENT)
Dept: NEUROLOGY | Facility: CLINIC | Age: 40
End: 2025-04-07
Payer: COMMERCIAL

## 2025-04-07 DIAGNOSIS — I77.74 DISSECTION OF VERTEBRAL ARTERY: ICD-10-CM

## 2025-04-07 DIAGNOSIS — G81.91 HEMIPLEGIA, UNSPECIFIED AFFECTING RIGHT DOMINANT SIDE: ICD-10-CM

## 2025-04-07 DIAGNOSIS — H53.461 HOMONYMOUS BILATERAL FIELD DEFECTS, RIGHT SIDE: ICD-10-CM

## 2025-04-07 DIAGNOSIS — I63.532 CEREBRAL INFARCTION DUE TO UNSPECIFIED OCCLUSION OR STENOSIS OF LEFT POSTERIOR CEREBRAL ARTERY: ICD-10-CM

## 2025-04-07 PROCEDURE — G2211 COMPLEX E/M VISIT ADD ON: CPT | Mod: NC,95

## 2025-04-07 PROCEDURE — 99214 OFFICE O/P EST MOD 30 MIN: CPT | Mod: 95

## 2025-04-25 ENCOUNTER — NON-APPOINTMENT (OUTPATIENT)
Age: 40
End: 2025-04-25

## 2025-04-25 ENCOUNTER — APPOINTMENT (OUTPATIENT)
Dept: OPHTHALMOLOGY | Facility: CLINIC | Age: 40
End: 2025-04-25
Payer: COMMERCIAL

## 2025-04-25 PROCEDURE — 92083 EXTENDED VISUAL FIELD XM: CPT

## 2025-04-25 PROCEDURE — 92014 COMPRE OPH EXAM EST PT 1/>: CPT

## 2025-05-07 ENCOUNTER — NON-APPOINTMENT (OUTPATIENT)
Age: 40
End: 2025-05-07

## 2025-06-12 ENCOUNTER — OUTPATIENT (OUTPATIENT)
Dept: OUTPATIENT SERVICES | Facility: HOSPITAL | Age: 40
LOS: 1 days | End: 2025-06-12
Payer: COMMERCIAL

## 2025-06-12 ENCOUNTER — APPOINTMENT (OUTPATIENT)
Dept: MRI IMAGING | Facility: CLINIC | Age: 40
End: 2025-06-12
Payer: COMMERCIAL

## 2025-06-12 DIAGNOSIS — G81.91 HEMIPLEGIA, UNSPECIFIED AFFECTING RIGHT DOMINANT SIDE: ICD-10-CM

## 2025-06-12 DIAGNOSIS — H53.461 HOMONYMOUS BILATERAL FIELD DEFECTS, RIGHT SIDE: ICD-10-CM

## 2025-06-12 DIAGNOSIS — I63.532 CEREBRAL INFARCTION DUE TO UNSPECIFIED OCCLUSION OR STENOSIS OF LEFT POSTERIOR CEREBRAL ARTERY: ICD-10-CM

## 2025-06-12 DIAGNOSIS — I77.74 DISSECTION OF VERTEBRAL ARTERY: ICD-10-CM

## 2025-06-12 DIAGNOSIS — Z90.89 ACQUIRED ABSENCE OF OTHER ORGANS: Chronic | ICD-10-CM

## 2025-06-12 PROCEDURE — 70544 MR ANGIOGRAPHY HEAD W/O DYE: CPT | Mod: 26,59

## 2025-06-12 PROCEDURE — A9585: CPT

## 2025-06-12 PROCEDURE — 70549 MR ANGIOGRAPH NECK W/O&W/DYE: CPT | Mod: 26

## 2025-06-12 PROCEDURE — 70551 MRI BRAIN STEM W/O DYE: CPT | Mod: 26

## 2025-06-12 PROCEDURE — 70544 MR ANGIOGRAPHY HEAD W/O DYE: CPT

## 2025-06-12 PROCEDURE — 70549 MR ANGIOGRAPH NECK W/O&W/DYE: CPT

## 2025-06-12 PROCEDURE — 70551 MRI BRAIN STEM W/O DYE: CPT

## 2025-06-17 ENCOUNTER — APPOINTMENT (OUTPATIENT)
Dept: NEUROLOGY | Facility: CLINIC | Age: 40
End: 2025-06-17
Payer: COMMERCIAL

## 2025-06-17 PROCEDURE — 99214 OFFICE O/P EST MOD 30 MIN: CPT | Mod: 95

## 2025-06-17 PROCEDURE — G2211 COMPLEX E/M VISIT ADD ON: CPT | Mod: NC,95
